# Patient Record
Sex: MALE | Race: WHITE | Employment: OTHER | URBAN - METROPOLITAN AREA
[De-identification: names, ages, dates, MRNs, and addresses within clinical notes are randomized per-mention and may not be internally consistent; named-entity substitution may affect disease eponyms.]

---

## 2017-01-03 ENCOUNTER — TELEPHONE (OUTPATIENT)
Dept: TELEMETRY | Age: 57
End: 2017-01-03

## 2017-01-05 ENCOUNTER — TELEPHONE (OUTPATIENT)
Dept: CARDIOLOGY CLINIC | Age: 57
End: 2017-01-05

## 2017-01-05 ENCOUNTER — ANTI-COAG VISIT (OUTPATIENT)
Dept: PHARMACY | Facility: CLINIC | Age: 57
End: 2017-01-05

## 2017-01-05 LAB — INR BLD: 1.8

## 2017-01-10 ENCOUNTER — ANTI-COAG VISIT (OUTPATIENT)
Dept: PHARMACY | Facility: CLINIC | Age: 57
End: 2017-01-10

## 2017-01-10 LAB — INR BLD: 1.7

## 2017-01-12 ENCOUNTER — HOSPITAL ENCOUNTER (OUTPATIENT)
Dept: GENERAL RADIOLOGY | Age: 57
Discharge: HOME | End: 2017-01-12
Attending: INTERNAL MEDICINE | Admitting: INTERNAL MEDICINE

## 2017-01-12 ENCOUNTER — OFFICE VISIT (OUTPATIENT)
Dept: CARDIOLOGY CLINIC | Age: 57
End: 2017-01-12

## 2017-01-12 VITALS
HEIGHT: 75 IN | WEIGHT: 298.4 LBS | SYSTOLIC BLOOD PRESSURE: 126 MMHG | BODY MASS INDEX: 37.1 KG/M2 | DIASTOLIC BLOOD PRESSURE: 88 MMHG | HEART RATE: 62 BPM

## 2017-01-12 DIAGNOSIS — E11.9 TYPE 2 DIABETES MELLITUS WITHOUT COMPLICATION, UNSPECIFIED LONG TERM INSULIN USE STATUS: ICD-10-CM

## 2017-01-12 DIAGNOSIS — E66.01 MORBID OBESITY DUE TO EXCESS CALORIES (HCC): ICD-10-CM

## 2017-01-12 DIAGNOSIS — I50.32 CHRONIC DIASTOLIC HEART FAILURE (HCC): ICD-10-CM

## 2017-01-12 DIAGNOSIS — G47.33 OSA (OBSTRUCTIVE SLEEP APNEA): ICD-10-CM

## 2017-01-12 DIAGNOSIS — I50.32 CHRONIC DIASTOLIC CONGESTIVE HEART FAILURE (HCC): Primary | ICD-10-CM

## 2017-01-12 DIAGNOSIS — I48.21 PERMANENT ATRIAL FIBRILLATION (HCC): ICD-10-CM

## 2017-01-12 LAB
ANION GAP SERPL CALCULATED.3IONS-SCNC: 14 MMOL/L (ref 3–16)
BASOPHILS ABSOLUTE: 0.1 K/UL (ref 0–0.2)
BASOPHILS RELATIVE PERCENT: 1.4 %
BUN BLDV-MCNC: 18 MG/DL (ref 7–20)
CALCIUM SERPL-MCNC: 9.3 MG/DL (ref 8.3–10.6)
CHLORIDE BLD-SCNC: 95 MMOL/L (ref 99–110)
CO2: 25 MMOL/L (ref 21–32)
CREAT SERPL-MCNC: 0.7 MG/DL (ref 0.9–1.3)
EOSINOPHILS ABSOLUTE: 0.1 K/UL (ref 0–0.6)
EOSINOPHILS RELATIVE PERCENT: 1.5 %
GFR AFRICAN AMERICAN: >60
GFR NON-AFRICAN AMERICAN: >60
GLUCOSE BLD-MCNC: 160 MG/DL (ref 70–99)
HCT VFR BLD CALC: 51.6 % (ref 40.5–52.5)
HEMOGLOBIN: 16.7 G/DL (ref 13.5–17.5)
LYMPHOCYTES ABSOLUTE: 1.6 K/UL (ref 1–5.1)
LYMPHOCYTES RELATIVE PERCENT: 16.7 %
MCH RBC QN AUTO: 27.7 PG (ref 26–34)
MCHC RBC AUTO-ENTMCNC: 32.4 G/DL (ref 31–36)
MCV RBC AUTO: 85.6 FL (ref 80–100)
MONOCYTES ABSOLUTE: 0.8 K/UL (ref 0–1.3)
MONOCYTES RELATIVE PERCENT: 8.2 %
NEUTROPHILS ABSOLUTE: 6.9 K/UL (ref 1.7–7.7)
NEUTROPHILS RELATIVE PERCENT: 72.2 %
PDW BLD-RTO: 15.5 % (ref 12.4–15.4)
PLATELET # BLD: 204 K/UL (ref 135–450)
PMV BLD AUTO: 9.9 FL (ref 5–10.5)
POTASSIUM SERPL-SCNC: 5 MMOL/L (ref 3.5–5.1)
PRO-BNP: 607 PG/ML (ref 0–124)
RBC # BLD: 6.02 M/UL (ref 4.2–5.9)
SODIUM BLD-SCNC: 134 MMOL/L (ref 136–145)
WBC # BLD: 9.6 K/UL (ref 4–11)

## 2017-01-12 PROCEDURE — 3045F PR MOST RECENT HEMOGLOBIN A1C LEVEL 7.0-9.0%: CPT | Performed by: INTERNAL MEDICINE

## 2017-01-12 PROCEDURE — G8484 FLU IMMUNIZE NO ADMIN: HCPCS | Performed by: INTERNAL MEDICINE

## 2017-01-12 PROCEDURE — 99214 OFFICE O/P EST MOD 30 MIN: CPT | Performed by: INTERNAL MEDICINE

## 2017-01-12 PROCEDURE — 4004F PT TOBACCO SCREEN RCVD TLK: CPT | Performed by: INTERNAL MEDICINE

## 2017-01-12 PROCEDURE — 1111F DSCHRG MED/CURRENT MED MERGE: CPT | Performed by: INTERNAL MEDICINE

## 2017-01-12 PROCEDURE — G8427 DOCREV CUR MEDS BY ELIG CLIN: HCPCS | Performed by: INTERNAL MEDICINE

## 2017-01-12 PROCEDURE — G8419 CALC BMI OUT NRM PARAM NOF/U: HCPCS | Performed by: INTERNAL MEDICINE

## 2017-01-12 PROCEDURE — 3017F COLORECTAL CA SCREEN DOC REV: CPT | Performed by: INTERNAL MEDICINE

## 2017-01-13 LAB
ESTIMATED AVERAGE GLUCOSE: 188.6 MG/DL
HBA1C MFR BLD: 8.2 %

## 2017-01-24 ENCOUNTER — ANTI-COAG VISIT (OUTPATIENT)
Dept: PHARMACY | Facility: CLINIC | Age: 57
End: 2017-01-24

## 2017-01-24 LAB — INR BLD: 1.6

## 2017-01-31 ENCOUNTER — ANTI-COAG VISIT (OUTPATIENT)
Dept: PHARMACY | Facility: CLINIC | Age: 57
End: 2017-01-31

## 2017-01-31 LAB — INR BLD: 2.5

## 2017-02-07 ENCOUNTER — ANTI-COAG VISIT (OUTPATIENT)
Dept: PHARMACY | Facility: CLINIC | Age: 57
End: 2017-02-07

## 2017-02-07 LAB — INR BLD: 1.9

## 2017-02-14 LAB — INR BLD: 2.3

## 2017-02-16 ENCOUNTER — ANTI-COAG VISIT (OUTPATIENT)
Dept: PHARMACY | Facility: CLINIC | Age: 57
End: 2017-02-16

## 2017-02-24 ENCOUNTER — OFFICE VISIT (OUTPATIENT)
Dept: CARDIOLOGY CLINIC | Age: 57
End: 2017-02-24

## 2017-02-24 VITALS
OXYGEN SATURATION: 91 % | HEIGHT: 75 IN | WEIGHT: 297.2 LBS | HEART RATE: 78 BPM | BODY MASS INDEX: 36.95 KG/M2 | DIASTOLIC BLOOD PRESSURE: 72 MMHG | SYSTOLIC BLOOD PRESSURE: 110 MMHG

## 2017-02-24 DIAGNOSIS — I50.32 CHRONIC DIASTOLIC HEART FAILURE (HCC): Primary | ICD-10-CM

## 2017-02-24 DIAGNOSIS — I10 ESSENTIAL HYPERTENSION: ICD-10-CM

## 2017-02-24 DIAGNOSIS — M54.9 BACK PAIN, UNSPECIFIED BACK LOCATION, UNSPECIFIED BACK PAIN LATERALITY, UNSPECIFIED CHRONICITY: ICD-10-CM

## 2017-02-24 DIAGNOSIS — I48.21 PERMANENT ATRIAL FIBRILLATION (HCC): ICD-10-CM

## 2017-02-24 DIAGNOSIS — R06.09 DOE (DYSPNEA ON EXERTION): ICD-10-CM

## 2017-02-24 PROCEDURE — 99214 OFFICE O/P EST MOD 30 MIN: CPT | Performed by: INTERNAL MEDICINE

## 2017-02-24 PROCEDURE — 3017F COLORECTAL CA SCREEN DOC REV: CPT | Performed by: INTERNAL MEDICINE

## 2017-02-24 PROCEDURE — G8417 CALC BMI ABV UP PARAM F/U: HCPCS | Performed by: INTERNAL MEDICINE

## 2017-02-24 PROCEDURE — G8484 FLU IMMUNIZE NO ADMIN: HCPCS | Performed by: INTERNAL MEDICINE

## 2017-02-24 PROCEDURE — 4004F PT TOBACCO SCREEN RCVD TLK: CPT | Performed by: INTERNAL MEDICINE

## 2017-02-24 PROCEDURE — G8427 DOCREV CUR MEDS BY ELIG CLIN: HCPCS | Performed by: INTERNAL MEDICINE

## 2017-02-24 RX ORDER — SPIRONOLACTONE 25 MG/1
25 TABLET ORAL DAILY
Qty: 30 TABLET | Refills: 5 | Status: SHIPPED | OUTPATIENT
Start: 2017-02-24 | End: 2018-01-03 | Stop reason: SDUPTHER

## 2017-04-04 ENCOUNTER — OFFICE VISIT (OUTPATIENT)
Dept: PULMONOLOGY | Age: 57
End: 2017-04-04

## 2017-04-04 VITALS
SYSTOLIC BLOOD PRESSURE: 138 MMHG | RESPIRATION RATE: 18 BRPM | BODY MASS INDEX: 37.5 KG/M2 | HEART RATE: 84 BPM | WEIGHT: 301.6 LBS | HEIGHT: 75 IN | OXYGEN SATURATION: 94 % | DIASTOLIC BLOOD PRESSURE: 78 MMHG | TEMPERATURE: 97.3 F

## 2017-04-04 DIAGNOSIS — R53.83 OTHER FATIGUE: ICD-10-CM

## 2017-04-04 DIAGNOSIS — R93.89 ABNORMAL CT SCAN, CHEST: ICD-10-CM

## 2017-04-04 DIAGNOSIS — R06.83 SNORING: ICD-10-CM

## 2017-04-04 DIAGNOSIS — F17.200 CURRENT SMOKER: ICD-10-CM

## 2017-04-04 DIAGNOSIS — R06.09 DYSPNEA ON EXERTION: ICD-10-CM

## 2017-04-04 DIAGNOSIS — R06.81 WITNESSED APNEIC SPELLS: Primary | ICD-10-CM

## 2017-04-04 PROCEDURE — G8417 CALC BMI ABV UP PARAM F/U: HCPCS | Performed by: INTERNAL MEDICINE

## 2017-04-04 PROCEDURE — 3017F COLORECTAL CA SCREEN DOC REV: CPT | Performed by: INTERNAL MEDICINE

## 2017-04-04 PROCEDURE — 4004F PT TOBACCO SCREEN RCVD TLK: CPT | Performed by: INTERNAL MEDICINE

## 2017-04-04 PROCEDURE — 99204 OFFICE O/P NEW MOD 45 MIN: CPT | Performed by: INTERNAL MEDICINE

## 2017-04-04 PROCEDURE — G8427 DOCREV CUR MEDS BY ELIG CLIN: HCPCS | Performed by: INTERNAL MEDICINE

## 2017-04-04 RX ORDER — ALBUTEROL SULFATE 90 UG/1
2 AEROSOL, METERED RESPIRATORY (INHALATION) EVERY 6 HOURS PRN
Qty: 1 INHALER | Refills: 6 | Status: SHIPPED | OUTPATIENT
Start: 2017-04-04

## 2017-04-04 ASSESSMENT — SLEEP AND FATIGUE QUESTIONNAIRES
HOW LIKELY ARE YOU TO NOD OFF OR FALL ASLEEP WHEN YOU ARE A PASSENGER IN A CAR FOR AN HOUR WITHOUT A BREAK: 0
HOW LIKELY ARE YOU TO NOD OFF OR FALL ASLEEP WHILE SITTING INACTIVE IN A PUBLIC PLACE: 0
NECK CIRCUMFERENCE (INCHES): 18
HOW LIKELY ARE YOU TO NOD OFF OR FALL ASLEEP WHILE SITTING QUIETLY AFTER LUNCH WITHOUT ALCOHOL: 0
HOW LIKELY ARE YOU TO NOD OFF OR FALL ASLEEP WHILE SITTING AND TALKING TO SOMEONE: 0
HOW LIKELY ARE YOU TO NOD OFF OR FALL ASLEEP IN A CAR, WHILE STOPPED FOR A FEW MINUTES IN TRAFFIC: 0
HOW LIKELY ARE YOU TO NOD OFF OR FALL ASLEEP WHILE WATCHING TV: 1
HOW LIKELY ARE YOU TO NOD OFF OR FALL ASLEEP WHILE SITTING AND READING: 0
ESS TOTAL SCORE: 4
HOW LIKELY ARE YOU TO NOD OFF OR FALL ASLEEP WHILE LYING DOWN TO REST IN THE AFTERNOON WHEN CIRCUMSTANCES PERMIT: 3

## 2017-04-13 ENCOUNTER — HOSPITAL ENCOUNTER (OUTPATIENT)
Dept: PULMONOLOGY | Age: 57
Discharge: HOME | End: 2017-04-13
Attending: INTERNAL MEDICINE | Admitting: INTERNAL MEDICINE

## 2017-04-13 DIAGNOSIS — R93.89 ABNORMAL CT SCAN, CHEST: ICD-10-CM

## 2017-04-13 DIAGNOSIS — R06.00 DYSPNEA: ICD-10-CM

## 2017-04-13 RX ORDER — ALBUTEROL SULFATE 90 UG/1
6 AEROSOL, METERED RESPIRATORY (INHALATION) ONCE
Status: COMPLETED | OUTPATIENT
Start: 2017-04-13 | End: 2017-04-13

## 2017-04-13 RX ADMIN — ALBUTEROL SULFATE 6 PUFF: 90 AEROSOL, METERED RESPIRATORY (INHALATION) at 11:41

## 2017-04-18 ENCOUNTER — ANTI-COAG VISIT (OUTPATIENT)
Dept: PHARMACY | Facility: CLINIC | Age: 57
End: 2017-04-18

## 2017-04-18 LAB — INR BLD: 2.7

## 2017-04-19 ENCOUNTER — HOSPITAL ENCOUNTER (OUTPATIENT)
Dept: SLEEP MEDICINE | Age: 57
Discharge: HOME | End: 2017-04-21
Attending: INTERNAL MEDICINE | Admitting: INTERNAL MEDICINE

## 2017-04-19 DIAGNOSIS — R06.81 WITNESSED APNEIC SPELLS: ICD-10-CM

## 2017-04-19 DIAGNOSIS — R06.83 SNORING: ICD-10-CM

## 2017-04-19 DIAGNOSIS — R53.83 OTHER FATIGUE: ICD-10-CM

## 2017-04-20 ENCOUNTER — TELEPHONE (OUTPATIENT)
Dept: PULMONOLOGY | Age: 57
End: 2017-04-20

## 2017-04-20 DIAGNOSIS — G47.33 OSA (OBSTRUCTIVE SLEEP APNEA): Primary | ICD-10-CM

## 2017-05-15 ENCOUNTER — HOSPITAL ENCOUNTER (OUTPATIENT)
Dept: GENERAL RADIOLOGY | Age: 57
Discharge: HOME | End: 2017-05-15
Attending: INTERNAL MEDICINE | Admitting: INTERNAL MEDICINE

## 2017-05-15 DIAGNOSIS — I50.32 CHRONIC DIASTOLIC HEART FAILURE (HCC): ICD-10-CM

## 2017-05-15 LAB
A/G RATIO: 1.4 (ref 1.1–2.2)
ALBUMIN SERPL-MCNC: 4 G/DL (ref 3.4–5)
ALP BLD-CCNC: 53 U/L (ref 40–129)
ALT SERPL-CCNC: 50 U/L (ref 10–40)
ANION GAP SERPL CALCULATED.3IONS-SCNC: 16 MMOL/L (ref 3–16)
AST SERPL-CCNC: 32 U/L (ref 15–37)
BASOPHILS ABSOLUTE: 0.1 K/UL (ref 0–0.2)
BASOPHILS RELATIVE PERCENT: 0.7 %
BILIRUB SERPL-MCNC: 0.4 MG/DL (ref 0–1)
BUN BLDV-MCNC: 23 MG/DL (ref 7–20)
CALCIUM SERPL-MCNC: 9.4 MG/DL (ref 8.3–10.6)
CHLORIDE BLD-SCNC: 98 MMOL/L (ref 99–110)
CO2: 26 MMOL/L (ref 21–32)
CREAT SERPL-MCNC: 0.9 MG/DL (ref 0.9–1.3)
EOSINOPHILS ABSOLUTE: 0.1 K/UL (ref 0–0.6)
EOSINOPHILS RELATIVE PERCENT: 1.6 %
GFR AFRICAN AMERICAN: >60
GFR NON-AFRICAN AMERICAN: >60
GLOBULIN: 2.8 G/DL
GLUCOSE BLD-MCNC: 120 MG/DL (ref 70–99)
HCT VFR BLD CALC: 48.9 % (ref 40.5–52.5)
HEMOGLOBIN: 16 G/DL (ref 13.5–17.5)
LYMPHOCYTES ABSOLUTE: 1.5 K/UL (ref 1–5.1)
LYMPHOCYTES RELATIVE PERCENT: 18.5 %
MCH RBC QN AUTO: 29.9 PG (ref 26–34)
MCHC RBC AUTO-ENTMCNC: 32.8 G/DL (ref 31–36)
MCV RBC AUTO: 91.3 FL (ref 80–100)
MONOCYTES ABSOLUTE: 0.7 K/UL (ref 0–1.3)
MONOCYTES RELATIVE PERCENT: 9.1 %
NEUTROPHILS ABSOLUTE: 5.7 K/UL (ref 1.7–7.7)
NEUTROPHILS RELATIVE PERCENT: 70.1 %
PDW BLD-RTO: 13.8 % (ref 12.4–15.4)
PLATELET # BLD: 239 K/UL (ref 135–450)
PMV BLD AUTO: 8.9 FL (ref 5–10.5)
POTASSIUM SERPL-SCNC: 4.5 MMOL/L (ref 3.5–5.1)
PRO-BNP: 901 PG/ML (ref 0–124)
RBC # BLD: 5.36 M/UL (ref 4.2–5.9)
SODIUM BLD-SCNC: 140 MMOL/L (ref 136–145)
TOTAL PROTEIN: 6.8 G/DL (ref 6.4–8.2)
WBC # BLD: 8.2 K/UL (ref 4–11)

## 2017-05-31 ENCOUNTER — OFFICE VISIT (OUTPATIENT)
Dept: CARDIOLOGY CLINIC | Age: 57
End: 2017-05-31

## 2017-05-31 VITALS
SYSTOLIC BLOOD PRESSURE: 124 MMHG | BODY MASS INDEX: 37.55 KG/M2 | HEART RATE: 76 BPM | HEIGHT: 75 IN | DIASTOLIC BLOOD PRESSURE: 76 MMHG | WEIGHT: 302 LBS

## 2017-05-31 DIAGNOSIS — I50.32 CHRONIC DIASTOLIC HEART FAILURE (HCC): Primary | ICD-10-CM

## 2017-05-31 DIAGNOSIS — I10 ESSENTIAL HYPERTENSION: ICD-10-CM

## 2017-05-31 DIAGNOSIS — I50.31 ACUTE DIASTOLIC (CONGESTIVE) HEART FAILURE (HCC): ICD-10-CM

## 2017-05-31 DIAGNOSIS — G47.33 OBSTRUCTIVE SLEEP APNEA SYNDROME: ICD-10-CM

## 2017-05-31 DIAGNOSIS — R06.09 DOE (DYSPNEA ON EXERTION): ICD-10-CM

## 2017-05-31 PROCEDURE — 99214 OFFICE O/P EST MOD 30 MIN: CPT | Performed by: INTERNAL MEDICINE

## 2017-05-31 PROCEDURE — 4004F PT TOBACCO SCREEN RCVD TLK: CPT | Performed by: INTERNAL MEDICINE

## 2017-05-31 PROCEDURE — G8427 DOCREV CUR MEDS BY ELIG CLIN: HCPCS | Performed by: INTERNAL MEDICINE

## 2017-05-31 PROCEDURE — 3017F COLORECTAL CA SCREEN DOC REV: CPT | Performed by: INTERNAL MEDICINE

## 2017-05-31 PROCEDURE — G8417 CALC BMI ABV UP PARAM F/U: HCPCS | Performed by: INTERNAL MEDICINE

## 2017-06-01 ENCOUNTER — HOSPITAL ENCOUNTER (OUTPATIENT)
Dept: SLEEP MEDICINE | Age: 57
Discharge: HOME | End: 2017-06-03
Attending: INTERNAL MEDICINE | Admitting: INTERNAL MEDICINE

## 2017-06-01 DIAGNOSIS — G47.33 OSA (OBSTRUCTIVE SLEEP APNEA): ICD-10-CM

## 2017-06-05 DIAGNOSIS — G47.33 OSA (OBSTRUCTIVE SLEEP APNEA): Primary | ICD-10-CM

## 2017-08-29 ENCOUNTER — TELEPHONE (OUTPATIENT)
Dept: PULMONOLOGY | Age: 57
End: 2017-08-29

## 2017-09-05 ENCOUNTER — HOSPITAL ENCOUNTER (OUTPATIENT)
Dept: OTHER | Age: 57
Discharge: HOME | End: 2017-09-30
Attending: INTERNAL MEDICINE | Admitting: INTERNAL MEDICINE

## 2017-09-05 LAB
ANION GAP SERPL CALCULATED.3IONS-SCNC: 15 MMOL/L (ref 3–16)
BUN BLDV-MCNC: 32 MG/DL (ref 7–20)
CALCIUM SERPL-MCNC: 9.6 MG/DL (ref 8.3–10.6)
CHLORIDE BLD-SCNC: 99 MMOL/L (ref 99–110)
CO2: 27 MMOL/L (ref 21–32)
CREAT SERPL-MCNC: 1 MG/DL (ref 0.9–1.3)
GFR AFRICAN AMERICAN: >60
GFR NON-AFRICAN AMERICAN: >60
GLUCOSE BLD-MCNC: 100 MG/DL (ref 70–99)
POTASSIUM SERPL-SCNC: 4.7 MMOL/L (ref 3.5–5.1)
PRO-BNP: 834 PG/ML (ref 0–124)
SODIUM BLD-SCNC: 141 MMOL/L (ref 136–145)

## 2017-09-06 ENCOUNTER — OFFICE VISIT (OUTPATIENT)
Dept: CARDIOLOGY CLINIC | Age: 57
End: 2017-09-06

## 2017-09-06 VITALS
WEIGHT: 301 LBS | SYSTOLIC BLOOD PRESSURE: 110 MMHG | BODY MASS INDEX: 37.42 KG/M2 | DIASTOLIC BLOOD PRESSURE: 80 MMHG | OXYGEN SATURATION: 95 % | HEIGHT: 75 IN | HEART RATE: 93 BPM

## 2017-09-06 DIAGNOSIS — Z72.0 TOBACCO ABUSE: ICD-10-CM

## 2017-09-06 DIAGNOSIS — G47.33 OBSTRUCTIVE SLEEP APNEA SYNDROME: ICD-10-CM

## 2017-09-06 DIAGNOSIS — I50.32 CHRONIC DIASTOLIC HEART FAILURE (HCC): Primary | ICD-10-CM

## 2017-09-06 PROCEDURE — 4004F PT TOBACCO SCREEN RCVD TLK: CPT | Performed by: INTERNAL MEDICINE

## 2017-09-06 PROCEDURE — 3017F COLORECTAL CA SCREEN DOC REV: CPT | Performed by: INTERNAL MEDICINE

## 2017-09-06 PROCEDURE — 99214 OFFICE O/P EST MOD 30 MIN: CPT | Performed by: INTERNAL MEDICINE

## 2017-09-06 PROCEDURE — G8417 CALC BMI ABV UP PARAM F/U: HCPCS | Performed by: INTERNAL MEDICINE

## 2017-09-06 PROCEDURE — G8427 DOCREV CUR MEDS BY ELIG CLIN: HCPCS | Performed by: INTERNAL MEDICINE

## 2017-09-08 ENCOUNTER — ANTI-COAG VISIT (OUTPATIENT)
Dept: PHARMACY | Facility: CLINIC | Age: 57
End: 2017-09-08

## 2017-09-08 LAB — INR BLD: 2.6

## 2017-09-27 ENCOUNTER — TELEPHONE (OUTPATIENT)
Dept: PULMONOLOGY | Age: 57
End: 2017-09-27

## 2017-10-04 ENCOUNTER — TELEPHONE (OUTPATIENT)
Dept: PULMONOLOGY | Age: 57
End: 2017-10-04

## 2018-01-03 DIAGNOSIS — I50.32 CHRONIC DIASTOLIC HEART FAILURE (HCC): ICD-10-CM

## 2018-01-03 RX ORDER — SPIRONOLACTONE 25 MG/1
25 TABLET ORAL DAILY
Qty: 30 TABLET | Refills: 5 | Status: SHIPPED | OUTPATIENT
Start: 2018-01-03

## 2018-01-09 ENCOUNTER — HOSPITAL ENCOUNTER (OUTPATIENT)
Dept: OTHER | Age: 58
Discharge: HOME | End: 2018-01-31
Attending: INTERNAL MEDICINE | Admitting: INTERNAL MEDICINE

## 2018-01-09 ENCOUNTER — OFFICE VISIT (OUTPATIENT)
Dept: CARDIOLOGY CLINIC | Age: 58
End: 2018-01-09

## 2018-01-09 ENCOUNTER — ANTI-COAG VISIT (OUTPATIENT)
Dept: PHARMACY | Facility: CLINIC | Age: 58
End: 2018-01-09

## 2018-01-09 VITALS
SYSTOLIC BLOOD PRESSURE: 104 MMHG | HEART RATE: 54 BPM | DIASTOLIC BLOOD PRESSURE: 86 MMHG | BODY MASS INDEX: 37.8 KG/M2 | WEIGHT: 304 LBS | HEIGHT: 75 IN | OXYGEN SATURATION: 93 %

## 2018-01-09 DIAGNOSIS — G47.33 OBSTRUCTIVE SLEEP APNEA SYNDROME: ICD-10-CM

## 2018-01-09 DIAGNOSIS — I10 ESSENTIAL HYPERTENSION: ICD-10-CM

## 2018-01-09 DIAGNOSIS — E66.01 MORBID OBESITY DUE TO EXCESS CALORIES (HCC): ICD-10-CM

## 2018-01-09 DIAGNOSIS — I50.32 CHRONIC DIASTOLIC HEART FAILURE (HCC): Primary | ICD-10-CM

## 2018-01-09 DIAGNOSIS — I48.21 PERMANENT ATRIAL FIBRILLATION (HCC): ICD-10-CM

## 2018-01-09 LAB — INR BLD: 3.1

## 2018-01-09 PROCEDURE — 99214 OFFICE O/P EST MOD 30 MIN: CPT | Performed by: INTERNAL MEDICINE

## 2018-01-09 PROCEDURE — 4004F PT TOBACCO SCREEN RCVD TLK: CPT | Performed by: INTERNAL MEDICINE

## 2018-01-09 PROCEDURE — G8427 DOCREV CUR MEDS BY ELIG CLIN: HCPCS | Performed by: INTERNAL MEDICINE

## 2018-01-09 PROCEDURE — 3017F COLORECTAL CA SCREEN DOC REV: CPT | Performed by: INTERNAL MEDICINE

## 2018-01-09 PROCEDURE — G8417 CALC BMI ABV UP PARAM F/U: HCPCS | Performed by: INTERNAL MEDICINE

## 2018-01-09 PROCEDURE — G8484 FLU IMMUNIZE NO ADMIN: HCPCS | Performed by: INTERNAL MEDICINE

## 2018-01-09 NOTE — LETTER
[address] [place] [address] Cardiology - [address] [place] [address] [address]  Phone: [phone]  Fax: [phone]    Rickey Mendoza MD        January 10, 2018     Palma Patel, [address] [place] [address] [facility]    Patient: Broderick Schroeder  MR Number: [id number]  YOB: 1960  Date of Visit: 1/9/2018    Dear Dr. Palma Patel:    Thank you for the request for consultation for Broderick Schroeder to me for the evaluation of CHF. Below are the relevant portions of my assessment and plan of care. Assessment:    1. Acute diastolic (congestive) heart failure ([address]    2. Chronic diastolic heart failure ([address]    3. Essential hypertension- BP: (104)/(86)     4. CHA (dyspnea on exertion)    5. Permanent atrial fibrillation ([address] on coumadin          Plan:  1. Smoking cessation discussed  2. Echo same day as 4 month follow up  3. Follow up with me in 4 months       If you have questions, please do not hesitate to call me. I look forward to following Marya Dandy along with you.     Sincerely,        Rickey Mendoza MD

## 2018-01-09 NOTE — PROGRESS NOTES
Allergies   Allergen Reactions    Flecainide      Rash and swelling     Current Outpatient Prescriptions   Medication Sig Dispense Refill    spironolactone (ALDACTONE) 25 MG tablet TAKE 1 TABLET BY MOUTH DAILY 30 tablet 5    albuterol sulfate HFA (VENTOLIN HFA) 108 (90 BASE) MCG/ACT inhaler Inhale 2 puffs into the lungs every 6 hours as needed for Wheezing or Shortness of Breath 1 Inhaler 6    furosemide (LASIX) 40 MG tablet Take 1 tablet by mouth 2 times daily 60 tablet 0    metoprolol tartrate (LOPRESSOR) 50 MG tablet Take 50 mg by mouth 2 times daily      oxyCODONE-acetaminophen (PERCOCET) 5-325 MG per tablet Take 1 tablet by mouth 2 times daily      quinapril (ACCUPRIL) 40 MG tablet Take 40 mg by mouth 2 times daily.  diltiazem (DILACOR XR) 240 MG XR capsule Take 240 mg by mouth daily.  cloNIDine (CATAPRES) 0.1 MG tablet Take 0.1 mg by mouth 2 times daily.  warfarin (COUMADIN) 5 MG tablet   Take 5 mg by mouth daily 10mg Q Tues-Sunday and 7.5mg Q Monday       No current facility-administered medications for this visit. Past Medical History:   Diagnosis Date    Asthma     allergy induced    [facility]     Diabetes mellitus ([facility]     Hypertension     Panic     Pneumonia      History reviewed. No pertinent surgical history. Family History   Problem Relation Age of Onset    Heart Disease Mother     High Blood Pressure Mother     Cancer Father      Social History     Social History    Marital status: Single     Spouse name: N/A    Number of children: N/A    Years of education: N/A     Occupational History    Not on file.      Social History Main Topics    Smoking status: Current Every Day Smoker     Packs/day: 0.25     Years: 40.00     Types: Cigarettes    Smokeless tobacco: Never Used      Comment: pack every 3 to 4 week    Alcohol use Yes      Comment: every other day    Drug use: Unknown    Sexual activity: Not on file     Other Topics Concern   

## 2018-02-01 ENCOUNTER — HOSPITAL ENCOUNTER (OUTPATIENT)
Dept: OTHER | Age: 58
Discharge: HOME | End: 2018-02-28
Attending: INTERNAL MEDICINE | Admitting: INTERNAL MEDICINE

## 2018-04-12 ENCOUNTER — HOSPITAL ENCOUNTER (OUTPATIENT)
Dept: OTHER | Age: 58
Discharge: HOME | End: 2018-04-30
Attending: INTERNAL MEDICINE | Admitting: INTERNAL MEDICINE

## 2018-04-12 ENCOUNTER — ANTI-COAG VISIT (OUTPATIENT)
Dept: PHARMACY | Facility: CLINIC | Age: 58
End: 2018-04-12

## 2018-04-12 LAB — INR BLD: 3.4

## 2018-05-01 ENCOUNTER — HOSPITAL ENCOUNTER (OUTPATIENT)
Dept: OTHER | Age: 58
Discharge: HOME | End: 2018-05-31
Attending: INTERNAL MEDICINE | Admitting: INTERNAL MEDICINE

## 2018-05-11 ENCOUNTER — HOSPITAL ENCOUNTER (OUTPATIENT)
Dept: CARDIOLOGY | Facility: CLINIC | Age: 58
Discharge: HOME | End: 2018-05-12
Attending: INTERNAL MEDICINE | Admitting: INTERNAL MEDICINE

## 2018-05-11 DIAGNOSIS — I50.32 CHRONIC DIASTOLIC HEART FAILURE (HCC): ICD-10-CM

## 2018-05-11 LAB
LV EF: 55 %
LVEF MODALITY: NORMAL

## 2018-05-15 ENCOUNTER — OFFICE VISIT (OUTPATIENT)
Dept: CARDIOLOGY CLINIC | Age: 58
End: 2018-05-15

## 2018-05-15 VITALS
DIASTOLIC BLOOD PRESSURE: 82 MMHG | HEART RATE: 62 BPM | WEIGHT: 300.4 LBS | SYSTOLIC BLOOD PRESSURE: 118 MMHG | OXYGEN SATURATION: 94 % | HEIGHT: 75 IN | BODY MASS INDEX: 37.35 KG/M2

## 2018-05-15 DIAGNOSIS — Z79.899 MEDICATION MANAGEMENT: ICD-10-CM

## 2018-05-15 DIAGNOSIS — Z72.0 TOBACCO ABUSE: ICD-10-CM

## 2018-05-15 DIAGNOSIS — E66.01 MORBID OBESITY DUE TO EXCESS CALORIES (HCC): ICD-10-CM

## 2018-05-15 DIAGNOSIS — I50.32 CHRONIC DIASTOLIC HEART FAILURE (HCC): Primary | ICD-10-CM

## 2018-05-15 DIAGNOSIS — R06.09 DOE (DYSPNEA ON EXERTION): ICD-10-CM

## 2018-05-15 DIAGNOSIS — I10 ESSENTIAL HYPERTENSION: ICD-10-CM

## 2018-05-15 DIAGNOSIS — G47.33 OBSTRUCTIVE SLEEP APNEA SYNDROME: ICD-10-CM

## 2018-05-15 DIAGNOSIS — Z79.899 MEDICATION MANAGEMENT: Primary | ICD-10-CM

## 2018-05-15 DIAGNOSIS — I48.21 PERMANENT ATRIAL FIBRILLATION (HCC): ICD-10-CM

## 2018-05-15 LAB
ANION GAP SERPL CALCULATED.3IONS-SCNC: 18 MMOL/L (ref 3–16)
BASOPHILS ABSOLUTE: 0.1 K/UL (ref 0–0.2)
BASOPHILS RELATIVE PERCENT: 0.8 %
BUN BLDV-MCNC: 21 MG/DL (ref 7–20)
CALCIUM SERPL-MCNC: 8.7 MG/DL (ref 8.3–10.6)
CHLORIDE BLD-SCNC: 99 MMOL/L (ref 99–110)
CHOLESTEROL, TOTAL: 156 MG/DL (ref 0–199)
CO2: 29 MMOL/L (ref 21–32)
CREAT SERPL-MCNC: 0.7 MG/DL (ref 0.9–1.3)
EOSINOPHILS ABSOLUTE: 0.2 K/UL (ref 0–0.6)
EOSINOPHILS RELATIVE PERCENT: 2.3 %
GFR AFRICAN AMERICAN: >60
GFR NON-AFRICAN AMERICAN: >60
GLUCOSE BLD-MCNC: 93 MG/DL (ref 70–99)
HCT VFR BLD CALC: 50.4 % (ref 40.5–52.5)
HDLC SERPL-MCNC: 38 MG/DL (ref 40–60)
HEMOGLOBIN: 17.2 G/DL (ref 13.5–17.5)
LDL CHOLESTEROL CALCULATED: 86 MG/DL
LYMPHOCYTES ABSOLUTE: 1.8 K/UL (ref 1–5.1)
LYMPHOCYTES RELATIVE PERCENT: 18.6 %
MCH RBC QN AUTO: 31.8 PG (ref 26–34)
MCHC RBC AUTO-ENTMCNC: 34.1 G/DL (ref 31–36)
MCV RBC AUTO: 93.2 FL (ref 80–100)
MONOCYTES ABSOLUTE: 0.9 K/UL (ref 0–1.3)
MONOCYTES RELATIVE PERCENT: 9.2 %
NEUTROPHILS ABSOLUTE: 6.5 K/UL (ref 1.7–7.7)
NEUTROPHILS RELATIVE PERCENT: 69.1 %
PDW BLD-RTO: 14.2 % (ref 12.4–15.4)
PLATELET # BLD: 165 K/UL (ref 135–450)
PMV BLD AUTO: 9.9 FL (ref 5–10.5)
POTASSIUM SERPL-SCNC: 4.1 MMOL/L (ref 3.5–5.1)
PRO-BNP: 868 PG/ML (ref 0–124)
RBC # BLD: 5.41 M/UL (ref 4.2–5.9)
SODIUM BLD-SCNC: 146 MMOL/L (ref 136–145)
TRIGL SERPL-MCNC: 160 MG/DL (ref 0–150)
VLDLC SERPL CALC-MCNC: 32 MG/DL
WBC # BLD: 9.5 K/UL (ref 4–11)

## 2018-05-15 PROCEDURE — 4004F PT TOBACCO SCREEN RCVD TLK: CPT | Performed by: INTERNAL MEDICINE

## 2018-05-15 PROCEDURE — G8417 CALC BMI ABV UP PARAM F/U: HCPCS | Performed by: INTERNAL MEDICINE

## 2018-05-15 PROCEDURE — 3017F COLORECTAL CA SCREEN DOC REV: CPT | Performed by: INTERNAL MEDICINE

## 2018-05-15 PROCEDURE — 99214 OFFICE O/P EST MOD 30 MIN: CPT | Performed by: INTERNAL MEDICINE

## 2018-05-15 PROCEDURE — G8427 DOCREV CUR MEDS BY ELIG CLIN: HCPCS | Performed by: INTERNAL MEDICINE

## 2018-06-01 ENCOUNTER — HOSPITAL ENCOUNTER (OUTPATIENT)
Dept: OTHER | Age: 58
Discharge: HOME | End: 2018-06-30
Attending: INTERNAL MEDICINE | Admitting: INTERNAL MEDICINE

## 2018-06-18 ENCOUNTER — ANTI-COAG VISIT (OUTPATIENT)
Dept: PHARMACY | Facility: CLINIC | Age: 58
End: 2018-06-18

## 2018-06-18 LAB — INR BLD: 2

## 2018-07-01 ENCOUNTER — HOSPITAL ENCOUNTER (OUTPATIENT)
Dept: OTHER | Age: 58
Discharge: HOME | End: 2018-07-01
Attending: INTERNAL MEDICINE | Admitting: INTERNAL MEDICINE

## 2018-07-16 ENCOUNTER — ANTI-COAG VISIT (OUTPATIENT)
Dept: PHARMACY | Age: 58
End: 2018-07-16
Payer: MEDICARE

## 2018-07-16 LAB — INR BLD: 2.4

## 2018-07-16 PROCEDURE — 85610 PROTHROMBIN TIME: CPT | Performed by: FAMILY MEDICINE

## 2018-07-16 PROCEDURE — 99211 OFF/OP EST MAY X REQ PHY/QHP: CPT | Performed by: FAMILY MEDICINE

## 2018-07-16 NOTE — PROGRESS NOTES
Mr. Chin Hall is here for management of anticoagulation for Afib. Pt started on Coumadin in 2013. He presents today w/out complaint. Pt verified dosing regimen. PMH significant DMII, HTN, and chronic back pain. Pt denies s/s bleeding/bruising/swelling/SOB. No BRBPR. No melena. Pt states he did not miss any doses. No changes in RX/OTCs/Herbal medications. Pt takes his Warfarin in the AM.   Pt has occasional EtOH use; occasional smoker. Pt has not been here since Sept of 2017. Hx of noncompliance with appts. No changes in diet or medications. No abnormal bruising or bleeding, but patient states he bruises less often lately. No missed doses. Patient states he has been taking 10mg Mon/Fri, and 7.5mg all other days. INR 2.4 is within acceptable therapeutic range of 2-3. Recommend to continue 7.5 mg daily, except 10mg on Mon/Fri. Patient has 5mg and 7.5mg tabs. Will continue to monitor and check in 4 weeks. Dosing reminder card given with phone number, appointment date and time.    Return to clinic: 8/13/18 at Erin Ye PharmD 7/16/2018 12:04 PM

## 2018-10-01 ENCOUNTER — ANTI-COAG VISIT (OUTPATIENT)
Dept: PHARMACY | Age: 58
End: 2018-10-01
Payer: MEDICARE

## 2018-10-01 LAB — INR BLD: 3.1

## 2018-10-01 PROCEDURE — 99211 OFF/OP EST MAY X REQ PHY/QHP: CPT | Performed by: INTERNAL MEDICINE

## 2018-10-01 PROCEDURE — 85610 PROTHROMBIN TIME: CPT | Performed by: INTERNAL MEDICINE

## 2018-10-01 RX ORDER — GABAPENTIN 300 MG/1
300 CAPSULE ORAL
COMMUNITY
End: 2022-04-26

## 2018-11-13 ENCOUNTER — OFFICE VISIT (OUTPATIENT)
Dept: CARDIOLOGY CLINIC | Age: 58
End: 2018-11-13
Payer: MEDICARE

## 2018-11-13 VITALS
OXYGEN SATURATION: 96 % | HEART RATE: 50 BPM | HEIGHT: 75 IN | SYSTOLIC BLOOD PRESSURE: 110 MMHG | DIASTOLIC BLOOD PRESSURE: 82 MMHG | WEIGHT: 305.2 LBS | BODY MASS INDEX: 37.95 KG/M2

## 2018-11-13 DIAGNOSIS — Z72.0 TOBACCO ABUSE: ICD-10-CM

## 2018-11-13 DIAGNOSIS — I50.32 CHRONIC DIASTOLIC HEART FAILURE (HCC): Primary | ICD-10-CM

## 2018-11-13 DIAGNOSIS — I48.21 PERMANENT ATRIAL FIBRILLATION (HCC): ICD-10-CM

## 2018-11-13 DIAGNOSIS — E66.01 MORBID OBESITY DUE TO EXCESS CALORIES (HCC): ICD-10-CM

## 2018-11-13 DIAGNOSIS — I10 ESSENTIAL HYPERTENSION: ICD-10-CM

## 2018-11-13 PROCEDURE — G8427 DOCREV CUR MEDS BY ELIG CLIN: HCPCS | Performed by: INTERNAL MEDICINE

## 2018-11-13 PROCEDURE — 3017F COLORECTAL CA SCREEN DOC REV: CPT | Performed by: INTERNAL MEDICINE

## 2018-11-13 PROCEDURE — G8484 FLU IMMUNIZE NO ADMIN: HCPCS | Performed by: INTERNAL MEDICINE

## 2018-11-13 PROCEDURE — 99214 OFFICE O/P EST MOD 30 MIN: CPT | Performed by: INTERNAL MEDICINE

## 2018-11-13 PROCEDURE — 4004F PT TOBACCO SCREEN RCVD TLK: CPT | Performed by: INTERNAL MEDICINE

## 2018-11-13 PROCEDURE — G8417 CALC BMI ABV UP PARAM F/U: HCPCS | Performed by: INTERNAL MEDICINE

## 2018-11-13 NOTE — LETTER
Echo done on 5/11/18 shows EF 55% (see report below). Today he reports he has been able to sleep in the bed at night. He purchased a new bed which has helped. He was started on gabapentin for chronic back pain. He had a colonoscopy in which they removed 14 polyps. He had one polyp that was suspicious. He is off warfarin for another week. He needs a repeat colonoscopy in 3 months. He denies CP, increased SOB, dizziness or syncope. ECHO 5/11/18   Summary   Normal left ventricle systolic function with an estimated ejection fraction   of 55%. No regional wall motion abnormalities are seen. Elevated left ventricular diastolic filling pressure. The left atrium is mildly dilated. The right atrium and ventricle are moderately dilated. Mild mitral and tricuspid regurgitation. Systolic pulmonary artery pressure (SPAP) estimated at 41 mmHg (RA pressure   8 mmHg), consistent with mild pulmonary hypertension. Atrial fibrillation throughout the study. Allergies   Allergen Reactions    Flecainide      Rash and swelling     Current Outpatient Prescriptions   Medication Sig Dispense Refill    gabapentin (NEURONTIN) 300 MG capsule Take 300 mg by mouth nightly. Andra Bar  spironolactone (ALDACTONE) 25 MG tablet TAKE 1 TABLET BY MOUTH DAILY 30 tablet 5    albuterol sulfate HFA (VENTOLIN HFA) 108 (90 BASE) MCG/ACT inhaler Inhale 2 puffs into the lungs every 6 hours as needed for Wheezing or Shortness of Breath 1 Inhaler 6    furosemide (LASIX) 40 MG tablet Take 1 tablet by mouth 2 times daily 60 tablet 0    metoprolol tartrate (LOPRESSOR) 50 MG tablet Take 50 mg by mouth 2 times daily      oxyCODONE-acetaminophen (PERCOCET) 5-325 MG per tablet Take 1 tablet by mouth 2 times daily      quinapril (ACCUPRIL) 40 MG tablet Take 40 mg by mouth 2 times daily.  diltiazem (DILACOR XR) 240 MG XR capsule Take 240 mg by mouth daily.  cloNIDine (CATAPRES) 0.1 MG tablet Take 0.1 mg by mouth 2 times daily.

## 2019-03-01 ENCOUNTER — ANTI-COAG VISIT (OUTPATIENT)
Dept: PHARMACY | Age: 59
End: 2019-03-01
Payer: MEDICAID

## 2019-03-01 LAB — INR BLD: 3.1

## 2019-03-01 PROCEDURE — 85610 PROTHROMBIN TIME: CPT | Performed by: FAMILY MEDICINE

## 2019-03-01 PROCEDURE — 99211 OFF/OP EST MAY X REQ PHY/QHP: CPT | Performed by: FAMILY MEDICINE

## 2019-03-18 ENCOUNTER — HOSPITAL ENCOUNTER (OUTPATIENT)
Age: 59
Discharge: HOME | End: 2019-03-18
Payer: MEDICARE

## 2019-03-18 DIAGNOSIS — I50.32 CHRONIC DIASTOLIC HEART FAILURE (HCC): ICD-10-CM

## 2019-03-18 DIAGNOSIS — R06.09 DOE (DYSPNEA ON EXERTION): ICD-10-CM

## 2019-03-18 DIAGNOSIS — I10 ESSENTIAL HYPERTENSION: ICD-10-CM

## 2019-03-18 DIAGNOSIS — G47.33 OBSTRUCTIVE SLEEP APNEA SYNDROME: ICD-10-CM

## 2019-03-18 LAB
A/G RATIO: 1.6 (ref 1.1–2.2)
ALBUMIN SERPL-MCNC: 4.3 G/DL (ref 3.4–5)
ALP BLD-CCNC: 44 U/L (ref 40–129)
ALT SERPL-CCNC: 52 U/L (ref 10–40)
ANION GAP SERPL CALCULATED.3IONS-SCNC: 16 MMOL/L (ref 3–16)
AST SERPL-CCNC: 37 U/L (ref 15–37)
BASOPHILS ABSOLUTE: 0 K/UL (ref 0–0.2)
BASOPHILS RELATIVE PERCENT: 0.7 %
BILIRUB SERPL-MCNC: 0.5 MG/DL (ref 0–1)
BUN BLDV-MCNC: 14 MG/DL (ref 7–20)
CALCIUM SERPL-MCNC: 9.1 MG/DL (ref 8.3–10.6)
CHLORIDE BLD-SCNC: 100 MMOL/L (ref 99–110)
CHOLESTEROL, TOTAL: 131 MG/DL (ref 0–199)
CO2: 27 MMOL/L (ref 21–32)
CREAT SERPL-MCNC: 0.7 MG/DL (ref 0.9–1.3)
EOSINOPHILS ABSOLUTE: 0.1 K/UL (ref 0–0.6)
EOSINOPHILS RELATIVE PERCENT: 2.2 %
GFR AFRICAN AMERICAN: >60
GFR NON-AFRICAN AMERICAN: >60
GLOBULIN: 2.7 G/DL
GLUCOSE BLD-MCNC: 103 MG/DL (ref 70–99)
HCT VFR BLD CALC: 51.7 % (ref 40.5–52.5)
HDLC SERPL-MCNC: 33 MG/DL (ref 40–60)
HEMOGLOBIN: 17.1 G/DL (ref 13.5–17.5)
LDL CHOLESTEROL CALCULATED: 58 MG/DL
LYMPHOCYTES ABSOLUTE: 1.8 K/UL (ref 1–5.1)
LYMPHOCYTES RELATIVE PERCENT: 28.3 %
MCH RBC QN AUTO: 30.7 PG (ref 26–34)
MCHC RBC AUTO-ENTMCNC: 33.2 G/DL (ref 31–36)
MCV RBC AUTO: 92.5 FL (ref 80–100)
MONOCYTES ABSOLUTE: 0.6 K/UL (ref 0–1.3)
MONOCYTES RELATIVE PERCENT: 9.1 %
NEUTROPHILS ABSOLUTE: 3.8 K/UL (ref 1.7–7.7)
NEUTROPHILS RELATIVE PERCENT: 59.7 %
PDW BLD-RTO: 14 % (ref 12.4–15.4)
PLATELET # BLD: 165 K/UL (ref 135–450)
PMV BLD AUTO: 9.6 FL (ref 5–10.5)
POTASSIUM SERPL-SCNC: 4 MMOL/L (ref 3.5–5.1)
PRO-BNP: 1169 PG/ML (ref 0–124)
RBC # BLD: 5.59 M/UL (ref 4.2–5.9)
SODIUM BLD-SCNC: 143 MMOL/L (ref 136–145)
TOTAL PROTEIN: 7 G/DL (ref 6.4–8.2)
TRIGL SERPL-MCNC: 202 MG/DL (ref 0–150)
VLDLC SERPL CALC-MCNC: 40 MG/DL
WBC # BLD: 6.3 K/UL (ref 4–11)

## 2019-03-18 PROCEDURE — 36415 COLL VENOUS BLD VENIPUNCTURE: CPT

## 2019-03-18 PROCEDURE — 85025 COMPLETE CBC W/AUTO DIFF WBC: CPT

## 2019-03-18 PROCEDURE — 80053 COMPREHEN METABOLIC PANEL: CPT

## 2019-03-18 PROCEDURE — 83880 ASSAY OF NATRIURETIC PEPTIDE: CPT

## 2019-03-18 PROCEDURE — 80061 LIPID PANEL: CPT

## 2019-03-19 ENCOUNTER — OFFICE VISIT (OUTPATIENT)
Dept: CARDIOLOGY CLINIC | Age: 59
End: 2019-03-19
Payer: COMMERCIAL

## 2019-03-19 VITALS
HEART RATE: 74 BPM | OXYGEN SATURATION: 96 % | BODY MASS INDEX: 38.84 KG/M2 | WEIGHT: 312.4 LBS | SYSTOLIC BLOOD PRESSURE: 110 MMHG | HEIGHT: 75 IN | DIASTOLIC BLOOD PRESSURE: 70 MMHG

## 2019-03-19 DIAGNOSIS — Z72.0 TOBACCO ABUSE: ICD-10-CM

## 2019-03-19 DIAGNOSIS — I10 ESSENTIAL HYPERTENSION: ICD-10-CM

## 2019-03-19 DIAGNOSIS — I50.32 CHRONIC DIASTOLIC HEART FAILURE (HCC): Primary | ICD-10-CM

## 2019-03-19 DIAGNOSIS — E78.2 MIXED HYPERLIPIDEMIA: ICD-10-CM

## 2019-03-19 DIAGNOSIS — I48.21 PERMANENT ATRIAL FIBRILLATION (HCC): ICD-10-CM

## 2019-03-19 PROCEDURE — G8417 CALC BMI ABV UP PARAM F/U: HCPCS | Performed by: INTERNAL MEDICINE

## 2019-03-19 PROCEDURE — G8484 FLU IMMUNIZE NO ADMIN: HCPCS | Performed by: INTERNAL MEDICINE

## 2019-03-19 PROCEDURE — 99214 OFFICE O/P EST MOD 30 MIN: CPT | Performed by: INTERNAL MEDICINE

## 2019-03-19 PROCEDURE — 4004F PT TOBACCO SCREEN RCVD TLK: CPT | Performed by: INTERNAL MEDICINE

## 2019-03-19 PROCEDURE — 3017F COLORECTAL CA SCREEN DOC REV: CPT | Performed by: INTERNAL MEDICINE

## 2019-03-19 PROCEDURE — G8427 DOCREV CUR MEDS BY ELIG CLIN: HCPCS | Performed by: INTERNAL MEDICINE

## 2019-03-19 RX ORDER — ATORVASTATIN CALCIUM 40 MG/1
40 TABLET, FILM COATED ORAL DAILY
COMMUNITY

## 2019-03-19 RX ORDER — LISINOPRIL 40 MG/1
40 TABLET ORAL DAILY
COMMUNITY

## 2019-04-04 ENCOUNTER — ANTI-COAG VISIT (OUTPATIENT)
Dept: PHARMACY | Age: 59
End: 2019-04-04
Payer: MEDICARE

## 2019-04-04 LAB — INR BLD: 2

## 2019-04-04 PROCEDURE — 99211 OFF/OP EST MAY X REQ PHY/QHP: CPT

## 2019-04-04 PROCEDURE — 85610 PROTHROMBIN TIME: CPT

## 2019-04-04 NOTE — PROGRESS NOTES
Mr. Uriah Mann is here for management of anticoagulation for Afib. Pt started on Coumadin in 2013. He presents today w/out complaint. Pt verified dosing regimen. PMH significant DMII, HTN, and chronic back pain. Pt denies s/s bleeding/bruising/swelling/SOB. No BRBPR. No melena. Pt states he did not miss any doses. No changes in OTCs/Herbal medications. Added gabapentin to his percocet at night, it is helping him sleep. Pt takes his Warfarin in the AM.   Pt has occasional EtOH use; occasional smoker. Pt has not been here since Sept of 2017. Hx of noncompliance with appts. INR 2.0 is within acceptable therapeutic range of 2-3. Recommend to continue  7.5 mg daily  Patient has 5mg and 7.5mg tabs. Will continue to monitor and check in 4 weeks. Dosing reminder card given with phone number, appointment date and time.    Return to clinic: 5/6 @ 11:30 am

## 2019-07-16 ENCOUNTER — ANTI-COAG VISIT (OUTPATIENT)
Dept: PHARMACY | Age: 59
End: 2019-07-16
Payer: COMMERCIAL

## 2019-07-16 LAB — INR BLD: 2.1

## 2019-07-16 PROCEDURE — 85610 PROTHROMBIN TIME: CPT | Performed by: INTERNAL MEDICINE

## 2019-07-16 PROCEDURE — 99211 OFF/OP EST MAY X REQ PHY/QHP: CPT | Performed by: INTERNAL MEDICINE

## 2019-09-09 NOTE — PROGRESS NOTES
[address]   Advanced Heart Failure/Pulmonary Hypertension  Cardiac Evaluation      Ge Medel  YOB: 1960    Date of Visit:  9/10/19    Chief Complaint   Patient presents with    6 Month Follow-Up     echo today    Congestive Heart Failure    Hypertension    Atrial Fibrillation    Shortness of Breath     has been sick the last 5 days         History of Present Illness:  Ge Medel is a 60 y.o. male who presents for follow up for CHF. He was admitted from 12/26/16-01/01/17 to [facility] with c/o sob and CHA on going since October this year. Pt was seen by PCP who treated him for pneumonia. 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 He is having to sit up most of the night as a result of persistent nocturnal coughing fits. He report snoring and problem having a sufficient sleep at night, PCP has been talking to him about getting a sleep study done. Pt is an active smoker, uses inhaler at home but unaware of any formal diagnosis of COPD. He is morbidly obese with chronic venous edema/stasis of BL lower ext which has not changed much from baseline. Pt denies any chest pain, no hx of CAD or CHF. Pt was found to be hypoxic in the mid 80s on RA which is new. CTA chest was negative for PE but showed bilateral areas of opacities, WBC was normal. Pt has baseline hx of HTN, Afib and obesity. He says that his swelling in legs is better but has significant abdominal swelling. He is on coumadin which is managed by his PCP. Echo done on 5/11/18 shows EF 55% (see report below). Today he reports he has increase SOB. He has been fighting a chest cold for the last 5 days. He started on mucinex on 09/09/19. He reports sinus drainage and chest tightness. He reports he has been coughing. He reports he will need surgery on his right shoulder soon.  He denies fatigue, palpitations, dizziness or syncope. He is taking lasix 40 mg twice daily. Allergies   Allergen Reactions    Flecainide      Rash and swelling     Current Outpatient Medications   Medication Sig Dispense Refill    atorvastatin (LIPITOR) 40 MG tablet Take 40 mg by mouth daily      lisinopril (PRINIVIL;ZESTRIL) 40 MG tablet Take 40 mg by mouth daily      gabapentin (NEURONTIN) 300 MG capsule Take 300 mg by mouth. PRN      spironolactone (ALDACTONE) 25 MG tablet TAKE 1 TABLET BY MOUTH DAILY 30 tablet 5    albuterol sulfate HFA (VENTOLIN HFA) 108 (90 BASE) MCG/ACT inhaler Inhale 2 puffs into the lungs every 6 hours as needed for Wheezing or Shortness of Breath 1 Inhaler 6    furosemide (LASIX) 40 MG tablet Take 1 tablet by mouth 2 times daily 60 tablet 0    metoprolol tartrate (LOPRESSOR) 50 MG tablet Take 50 mg by mouth 2 times daily      oxyCODONE-acetaminophen (PERCOCET) 5-325 MG per tablet Take 1 tablet by mouth 2 times daily      quinapril (ACCUPRIL) 40 MG tablet Take 40 mg by mouth 2 times daily.  diltiazem (DILACOR XR) 240 MG XR capsule Take 240 mg by mouth daily.  cloNIDine (CATAPRES) 0.1 MG tablet Take 0.1 mg by mouth 2 times daily.  warfarin (COUMADIN) 5 MG tablet   Take 5 mg by mouth daily 10mg Q Tues-Sunday and 7.5mg Q Monday       No current facility-administered medications for this visit. Past Medical History:   Diagnosis Date    Asthma     allergy induced    [facility]     Diabetes mellitus ([facility]     Hypertension     Panic     Pneumonia      History reviewed. No pertinent surgical history.   Family History   Problem Relation Age of Onset    Heart Disease Mother     High Blood Pressure Mother     Cancer Father      Social History     Socioeconomic History    Marital status: Single     Spouse name: Not on file    Number of children: Not on file    Years of education: Not on file    Highest education level: Not on file   Occupational History    Not on abnormalities of mood, affect, memory, mentation, or behavior are noted    Echo: 05/12/18   Normal left ventricle systolic function with an estimated ejection fraction   of 55%. No regional wall motion abnormalities are seen. Elevated left ventricular diastolic filling pressure. The left atrium is mildly dilated. The right atrium and ventricle are moderately dilated. Mild mitral and tricuspid regurgitation. Systolic pulmonary artery pressure (SPAP) estimated at 41 mmHg (RA pressure   8 mmHg), consistent with mild pulmonary hypertension. Atrial fibrillation throughout the study. Lipids: 03/2019: , LDL 88, HDL 42,     Labs were reviewed including labs from other hospital systems through [facility]. Cardiac testing was reviewed including echos, nuclear scans, cardiac catheterization, including from other hospital systems through [facility]. Assessment:    1. Chronic diastolic heart failure ([facility]    2. Essential hypertension    3. Permanent atrial fibrillation ([facility]    4. Tobacco abuse    5. Mixed hyperlipidemia         Plan:  1. Smoking cessation discussed  2. Lipids, BMP and BNP  3. Follow up in 6 months        QUALITY MEASURES  1. Tobacco Cessation Counseling: Yes  2. Retake of BP if >140/90:   NA  3. Documentation to PCP/referring for new patient:  Sent to PCP at close of office visit  4. CAD patient on anti-platelet: NA  5. CAD patient on STATIN therapy:  NA  6. Patient with CHF and aFib on anticoagulation:  Yes         I appreciate the opportunity of cooperating in the care of this patient. Scribe's attestation: This note was scribed in the presence of Nadia Thorne M.D. By Tina Riedel RN     The scribe's documentation has been prepared under my direction and personally reviewed by me in its entirety. I confirm that the note above accurately reflects all work, treatment, procedures, and medical decision making performed by me.       Nadia Thorne M.D.,

## 2019-09-10 ENCOUNTER — HOSPITAL ENCOUNTER (OUTPATIENT)
Dept: CARDIOLOGY | Age: 59
Discharge: HOME | End: 2019-09-10
Payer: COMMERCIAL

## 2019-09-10 ENCOUNTER — OFFICE VISIT (OUTPATIENT)
Dept: CARDIOLOGY CLINIC | Age: 59
End: 2019-09-10
Payer: COMMERCIAL

## 2019-09-10 VITALS
SYSTOLIC BLOOD PRESSURE: 126 MMHG | BODY MASS INDEX: 39.17 KG/M2 | HEIGHT: 75 IN | WEIGHT: 315 LBS | OXYGEN SATURATION: 94 % | DIASTOLIC BLOOD PRESSURE: 82 MMHG | HEART RATE: 90 BPM

## 2019-09-10 DIAGNOSIS — I10 ESSENTIAL HYPERTENSION: ICD-10-CM

## 2019-09-10 DIAGNOSIS — I48.21 PERMANENT ATRIAL FIBRILLATION (HCC): ICD-10-CM

## 2019-09-10 DIAGNOSIS — E78.2 MIXED HYPERLIPIDEMIA: ICD-10-CM

## 2019-09-10 DIAGNOSIS — I50.32 CHRONIC DIASTOLIC HEART FAILURE (HCC): Primary | ICD-10-CM

## 2019-09-10 DIAGNOSIS — Z72.0 TOBACCO ABUSE: ICD-10-CM

## 2019-09-10 DIAGNOSIS — I50.32 CHRONIC DIASTOLIC HEART FAILURE (HCC): ICD-10-CM

## 2019-09-10 LAB
LV EF: 50 %
LVEF MODALITY: NORMAL

## 2019-09-10 PROCEDURE — 93000 ELECTROCARDIOGRAM COMPLETE: CPT | Performed by: INTERNAL MEDICINE

## 2019-09-10 PROCEDURE — C8929 TTE W OR WO FOL WCON,DOPPLER: HCPCS

## 2019-09-10 PROCEDURE — 99214 OFFICE O/P EST MOD 30 MIN: CPT | Performed by: INTERNAL MEDICINE

## 2019-09-10 PROCEDURE — 6360000004 HC RX CONTRAST MEDICATION: Performed by: INTERNAL MEDICINE

## 2019-09-10 RX ADMIN — PERFLUTREN 2.2 MG: 6.52 INJECTION, SUSPENSION INTRAVENOUS at 10:42

## 2019-09-10 NOTE — LETTER
[facility], [place], operated by [facility]   Advanced Heart Failure/Pulmonary Hypertension  Cardiac Evaluation      Vidal Patrick  YOB: 1960    Date of Visit:  9/10/19    Chief Complaint   Patient presents with    6 Month Follow-Up     echo today    Congestive Heart Failure    Hypertension    Atrial Fibrillation    Shortness of Breath     has been sick the last 5 days         History of Present Illness:  Vidal Patrick is a 60 y.o. male who presents for follow up for CHF. He was admitted from 12/26/16-01/01/17 to [facility] with c/o sob and CHA on going since October this year. Pt was seen by PCP who treated him for pneumonia. 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 He is having to sit up most of the night as a result of persistent nocturnal coughing fits. He report snoring and problem having a sufficient sleep at night, PCP has been talking to him about getting a sleep study done. Pt is an active smoker, uses inhaler at home but unaware of any formal diagnosis of COPD. He is morbidly obese with chronic venous edema/stasis of BL lower ext which has not changed much from baseline. Pt denies any chest pain, no hx of CAD or CHF. Pt was found to be hypoxic in the mid 80s on RA which is new. CTA chest was negative for PE but showed bilateral areas of opacities, WBC was normal. Pt has baseline hx of HTN, Afib and obesity. He says that his swelling in legs is better but has significant abdominal swelling. He is on coumadin which is managed by his PCP. Echo done on 5/11/18 shows EF 55% (see report below). Today he reports he has increase SOB. He has been fighting a chest cold for the last 5 days. He started on mucinex on 09/09/19. He reports sinus drainage and chest tightness. He reports he has been coughing. He reports he will need surgery on his right shoulder soon.  He denies fatigue,

## 2019-10-16 ENCOUNTER — TELEPHONE (OUTPATIENT)
Dept: CARDIOLOGY CLINIC | Age: 59
End: 2019-10-16

## 2020-01-23 ENCOUNTER — ANTI-COAG VISIT (OUTPATIENT)
Dept: PHARMACY | Age: 60
End: 2020-01-23
Payer: MEDICARE

## 2020-01-23 LAB — INR BLD: 2

## 2020-01-23 PROCEDURE — 99211 OFF/OP EST MAY X REQ PHY/QHP: CPT

## 2020-01-23 PROCEDURE — 85610 PROTHROMBIN TIME: CPT

## 2020-01-23 NOTE — PROGRESS NOTES
Mr. Jaclyn Valle is here for management of anticoagulation for Afib. Pt started on Coumadin in 2013. He presents today w/out complaint. Pt verified dosing regimen. PMH significant DMII, HTN, and chronic back pain. Pt denies s/s bleeding/bruising/swelling/SOB. Pt states he did not miss any doses. No changes in Rx/OTCs/Herbal medications. Pt takes his Warfarin in the AM.   Pt has occasional EtOH use; occasional smoker. Pt has not been here since Sept of 2017. Hx of noncompliance with appts. Increased gabapentin from 300 mg bid to 300 mg tid     INR 2.0 is within acceptable therapeutic range of 2-3. Recommend to continue 7.5 mg daily  Patient has 5mg and 7.5mg tabs. Will continue to monitor and check in 4 weeks. Dosing reminder card given with phone number, appointment date and time. Return to clinic: 2/20 @ 1100   Referring physician: Yovanny Brown PharmD. Candidate 2020 1/23/20 10:21 AM     I have seen the patient and I agree with the assessment and plan created by the PharmD Candidate.   Lillian Andino PharmD 1/23/20 10:32 AM

## 2020-02-27 ENCOUNTER — ANTI-COAG VISIT (OUTPATIENT)
Dept: PHARMACY | Age: 60
End: 2020-02-27
Payer: MEDICARE

## 2020-02-27 ENCOUNTER — TELEPHONE (OUTPATIENT)
Dept: CARDIOLOGY CLINIC | Age: 60
End: 2020-02-27

## 2020-02-27 LAB — INR BLD: 1.9

## 2020-02-27 PROCEDURE — 99211 OFF/OP EST MAY X REQ PHY/QHP: CPT

## 2020-02-27 PROCEDURE — 85610 PROTHROMBIN TIME: CPT

## 2020-02-27 NOTE — TELEPHONE ENCOUNTER
[facility] Coumadin clinic is asking if pt needs lovenox bridging  for 5 day coumadin hold.  Please call to advise

## 2020-06-15 NOTE — PROGRESS NOTES
[address]   Advanced Heart Failure/Pulmonary Hypertension  Cardiac Evaluation      Alberta Stahl  YOB: 1960    Date of Visit:  6/16/20    Chief Complaint   Patient presents with    Follow-up    Congestive Heart Failure        History of Present Illness:  Alberta Stahl is a 60 y.o. male who presents for follow up for CHF. He was admitted from 12/26/16-01/01/17 to [facility] with c/o sob and CHA on going since October this year. Pt was seen by PCP who treated him for pneumonia. 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 He is having to sit up most of the night as a result of persistent nocturnal coughing fits. He report snoring and problem having a sufficient sleep at night, PCP has been talking to him about getting a sleep study done. Pt is an active smoker, uses inhaler at home but unaware of any formal diagnosis of COPD. He is morbidly obese with chronic venous edema/stasis of BL lower ext which has not changed much from baseline. Pt denies any chest pain, no hx of CAD or CHF. Pt was found to be hypoxic in the mid 80s on RA which is new. CTA chest was negative for PE but showed bilateral areas of opacities, WBC was normal. Pt has baseline hx of HTN, Afib and obesity. He says that his swelling in legs is better but has significant abdominal swelling. He is on coumadin which is managed by his PCP. Echo done on 5/11/18 shows EF 55% (see report below). Today his weight is up 20 lbs since 09/2019. He reports poor diet choices during the COVID 19 pandemic shut down. He reports occasional BLE edema before his evening dose of lasix but usually resolves after taking the medications. He denies CP, increased SOB, dizziness or syncope. He is due to see his PCP 07/05/2020. BP is elevated on exam. He takes lasix twice daily. He tends to forget his statin medication at night.  He is Inability: Not on file    Transportation needs     Medical: Not on file     Non-medical: Not on file   Tobacco Use    Smoking status: Current Every Day Smoker     Packs/day: 0.25     Years: 40.00     Pack years: 10.00     Types: Cigarettes    Smokeless tobacco: Never Used    Tobacco comment: pack every 3 to 4 week   Substance and Sexual Activity    Alcohol use: Yes     Comment: a couple times a week    Drug use: No    Sexual activity: Not on file   Lifestyle    Physical activity     Days per week: Not on file     Minutes per session: Not on file    Stress: Not on file   Relationships    Social connections     Talks on phone: Not on file     Gets together: Not on file     Attends [employer] service: Not on file     Active member of club or organization: Not on file     Attends meetings of clubs or organizations: Not on file     Relationship status: Not on file    Intimate partner violence     Fear of current or ex partner: Not on file     Emotionally abused: Not on file     Physically abused: Not on file     Forced sexual activity: Not on file   Other Topics Concern    Not on file   Social History Narrative    Not on file       Review of Systems:   · Constitutional: there has been no unanticipated weight loss. There's been no change in energy level, sleep pattern, or activity level. · Eyes: No visual changes or diplopia. No scleral icterus. · ENT: No Headaches, hearing loss or vertigo. No mouth sores or sore throat. · Cardiovascular: Reviewed in HPI  · Respiratory: No cough or wheezing, no sputum production. No hematemesis. · Gastrointestinal: No abdominal pain, appetite loss, blood in stools. No change in bowel or bladder habits. · Genitourinary: No dysuria, trouble voiding, or hematuria. · Musculoskeletal:  No gait disturbance, weakness or joint complaints. · Integumentary: No rash or pruritis. · Neurological: No headache, diplopia, change in muscle strength, numbness or tingling.  No

## 2020-06-16 ENCOUNTER — OFFICE VISIT (OUTPATIENT)
Dept: CARDIOLOGY CLINIC | Age: 60
End: 2020-06-16
Payer: MEDICARE

## 2020-06-16 VITALS
BODY MASS INDEX: 39.17 KG/M2 | HEART RATE: 57 BPM | HEIGHT: 75 IN | SYSTOLIC BLOOD PRESSURE: 146 MMHG | OXYGEN SATURATION: 94 % | DIASTOLIC BLOOD PRESSURE: 99 MMHG | WEIGHT: 315 LBS

## 2020-06-16 PROCEDURE — 99215 OFFICE O/P EST HI 40 MIN: CPT | Performed by: INTERNAL MEDICINE

## 2020-12-04 NOTE — PROGRESS NOTES
[address]   Advanced Heart Failure/Pulmonary Hypertension  Cardiac Evaluation      Marta Wilcox  YOB: 1960    Date of Visit:  12/8/20    Chief Complaint   Patient presents with    Follow-up    Congestive Heart Failure         History of Present Illness:  Marta Wilcox is a 63 y.o. male who presents for follow up for CHF. He was admitted from 12/26/16-01/01/17 to [facility] with c/o sob and CHA on going since October this year. Pt was seen by PCP who treated him for pneumonia. 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 He is having to sit up most of the night as a result of persistent nocturnal coughing fits. He report snoring and problem having a sufficient sleep at night, PCP has been talking to him about getting a sleep study done. Pt is an active smoker, uses inhaler at home but unaware of any formal diagnosis of COPD. He is morbidly obese with chronic venous edema/stasis of BL lower ext which has not changed much from baseline. Pt denies any chest pain, no hx of CAD or CHF. Pt was found to be hypoxic in the mid 80s on RA which is new. CTA chest was negative for PE but showed bilateral areas of opacities, WBC was normal. Pt has baseline hx of HTN, Afib and obesity. He says that his swelling in legs is better but has significant abdominal swelling. He is on coumadin which is managed by his PCP. Echo done on 5/11/18 shows EF 55% (see report below). On 06/16/20 his weight was up 20 lbs since 09/2019. He reports poor diet choices during the COVID 19 pandemic shut down. Today his echo showed RV dilated and normal EF. He reports he is unable to use the CPAP. He denies CP, SOB, dizziness or syncope. His weight is increasing. He continues to smoke. His A1C was increased to 8.3 in 10/2020. Lasix is twice a day. His coumadin clinic at [facility] 90 INR.  He is now sleeping in an adjustable bed. Allergies   Allergen Reactions    Flecainide      Rash and swelling     Current Outpatient Medications   Medication Sig Dispense Refill    atorvastatin (LIPITOR) 40 MG tablet Take 40 mg by mouth daily      lisinopril (PRINIVIL;ZESTRIL) 40 MG tablet Take 40 mg by mouth daily      gabapentin (NEURONTIN) 300 MG capsule Take 300 mg by mouth. PRN      spironolactone (ALDACTONE) 25 MG tablet TAKE 1 TABLET BY MOUTH DAILY 30 tablet 5    albuterol sulfate HFA (VENTOLIN HFA) 108 (90 BASE) MCG/ACT inhaler Inhale 2 puffs into the lungs every 6 hours as needed for Wheezing or Shortness of Breath 1 Inhaler 6    furosemide (LASIX) 40 MG tablet Take 1 tablet by mouth 2 times daily 60 tablet 0    metoprolol tartrate (LOPRESSOR) 50 MG tablet Take 50 mg by mouth 2 times daily      oxyCODONE-acetaminophen (PERCOCET) 5-325 MG per tablet Take 1 tablet by mouth 2 times daily      diltiazem (DILACOR XR) 240 MG XR capsule Take 240 mg by mouth daily.  cloNIDine (CATAPRES) 0.1 MG tablet Take 0.1 mg by mouth 2 times daily.  warfarin (COUMADIN) 5 MG tablet   Take 5 mg by mouth daily 10mg Q Tues-Sunday and 7.5mg Q Monday       No current facility-administered medications for this visit. Past Medical History:   Diagnosis Date    Asthma     allergy induced    [facility]rial fibrillation ([facility]     Diabetes mellitus ([facility]     Hypertension     Panic     Pneumonia      History reviewed. No pertinent surgical history.   Family History   Problem Relation Age of Onset    Heart Disease Mother     High Blood Pressure Mother     Cancer Father      Social History     Socioeconomic History    Marital status: Single     Spouse name: Not on file    Number of children: Not on file    Years of education: Not on file    Highest education level: Not on file   Occupational History    Not on file   Social Needs    Financial resource strain: Not on file    Food insecurity     Worry: Not on file Inability: Not on file    Transportation needs     Medical: Not on file     Non-medical: Not on file   Tobacco Use    Smoking status: Current Every Day Smoker     Packs/day: 0.25     Years: 40.00     Pack years: 10.00     Types: Cigarettes    Smokeless tobacco: Never Used    Tobacco comment: pack every 3 to 4 week   Substance and Sexual Activity    Alcohol use: Yes     Comment: a couple times a week    Drug use: No    Sexual activity: Not on file   Lifestyle    Physical activity     Days per week: Not on file     Minutes per session: Not on file    Stress: Not on file   Relationships    Social connections     Talks on phone: Not on file     Gets together: Not on file     Attends Baptism service: Not on file     Active member of club or organization: Not on file     Attends meetings of clubs or organizations: Not on file     Relationship status: Not on file    Intimate partner violence     Fear of current or ex partner: Not on file     Emotionally abused: Not on file     Physically abused: Not on file     Forced sexual activity: Not on file   Other Topics Concern    Not on file   Social History Narrative    Not on file       Review of Systems:   · Constitutional: there has been no unanticipated weight loss. There's been no change in energy level, sleep pattern, or activity level. · Eyes: No visual changes or diplopia. No scleral icterus. · ENT: No Headaches, hearing loss or vertigo. No mouth sores or sore throat. · Cardiovascular: Reviewed in HPI  · Respiratory: No cough or wheezing, no sputum production. No hematemesis. · Gastrointestinal: No abdominal pain, appetite loss, blood in stools. No change in bowel or bladder habits. · Genitourinary: No dysuria, trouble voiding, or hematuria. · Musculoskeletal:  No gait disturbance, weakness or joint complaints. · Integumentary: No rash or pruritis. · Neurological: No headache, diplopia, change in muscle strength, numbness or tingling.  No change in gait, balance, coordination, mood, affect, memory, mentation, behavior. · Psychiatric: No anxiety, no depression. · Endocrine: No malaise, fatigue or temperature intolerance. No excessive thirst, fluid intake, or urination. No tremor. · Hematologic/Lymphatic: No abnormal bruising or bleeding, blood clots or swollen lymph nodes. · Allergic/Immunologic: No nasal congestion or hives. Physical Examination:    Vitals:    12/08/20 1441   BP: 120/84   Pulse: 81   SpO2: 95%   Weight: (!) 337 lb 8 oz (153.1 kg)   Height: 6' 3\" (1.905 m)     Body mass index is 42.18 kg/m². Wt Readings from Last 3 Encounters:   12/08/20 (!) 337 lb 8 oz (153.1 kg)   06/16/20 (!) 337 lb (152.9 kg)   09/10/19 (!) 317 lb (143.8 kg)     BP Readings from Last 3 Encounters:   12/08/20 120/84   06/16/20 (!) 146/99   09/10/19 126/82            Constitutional and General Appearance:   WD/WN in NAD  HEENT:  NC/AT  Skin:  Warm, dry  Respiratory:  · Normal excursion and expansion without use of accessory muscles  · Resp Auscultation: Normal breath sounds without dullness  Cardiovascular:  · The apical impulses not displaced  · Heart tones are crisp and normal  · Cervical veins are not engorged  · The carotid upstroke is normal in amplitude and contour without delay or bruit  · JVP 9-10 cm H2O  Irregularly irregular rhythm with nl S1 and S2 without m,r,g  · Peripheral pulses are symmetrical and full  · There is no clubbing, cyanosis of the extremities.   · 1+ bilateral edema below the knees  · Femoral Arteries: 2+ and equal  · Pedal Pulses: 2+ and equal   Neck:  · JVP minimally elevated  · No thyromegaly  Abdomen:  · No masses or tenderness  · Liver/Spleen: No Abnormalities Noted  Neurological/Psychiatric:  · Alert and oriented in all spheres  · Moves all extremities well  · Exhibits normal gait balance and coordination  · No abnormalities of mood, affect, memory, mentation, or behavior are noted    Echo: 05/12/18   Normal left ventricle systolic function with an estimated ejection fraction   of 55%. No regional wall motion abnormalities are seen. Elevated left ventricular diastolic filling pressure. The left atrium is mildly dilated. The right atrium and ventricle are moderately dilated. Mild mitral and tricuspid regurgitation. Systolic pulmonary artery pressure (SPAP) estimated at 41 mmHg (RA pressure   8 mmHg), consistent with mild pulmonary hypertension. Atrial fibrillation throughout the study. Lipids: 03/2019: , LDL 88, HDL 42,     Labs were reviewed including labs from other hospital systems through [facility]. Cardiac testing was reviewed including echos, nuclear scans, cardiac catheterization, including from other hospital systems through [facility]. Assessment:    1. Chronic diastolic heart failure ([address]    2. Essential hypertension    3. Permanent atrial fibrillation ([facility]    4. Mixed hyperlipidemia             Plan:  1. Smoking cessation discussed  2. Re-establish with coumadin clinic  3. Follow up in 6 months      Scribe's attestation: This note was scribed in the presence of Santosh Santos M.D. By Rita Lopez RN     The scribe's documentation has been prepared under my direction and personally reviewed by me in its entirety. I confirm that the note above accurately reflects all work, treatment, procedures, and medical decision making performed by me. I appreciate the opportunity of cooperating in the care of this patient.     Santosh Santos M.D., [facility] [place]

## 2020-12-08 ENCOUNTER — OFFICE VISIT (OUTPATIENT)
Dept: CARDIOLOGY CLINIC | Age: 60
End: 2020-12-08
Payer: MEDICARE

## 2020-12-08 ENCOUNTER — HOSPITAL ENCOUNTER (OUTPATIENT)
Dept: CARDIOLOGY | Age: 60
Discharge: HOME | End: 2020-12-08
Payer: MEDICARE

## 2020-12-08 VITALS
DIASTOLIC BLOOD PRESSURE: 84 MMHG | WEIGHT: 315 LBS | HEART RATE: 81 BPM | BODY MASS INDEX: 39.17 KG/M2 | SYSTOLIC BLOOD PRESSURE: 120 MMHG | HEIGHT: 75 IN | OXYGEN SATURATION: 95 %

## 2020-12-08 LAB
LV EF: 50 %
LVEF MODALITY: NORMAL

## 2020-12-08 PROCEDURE — 99214 OFFICE O/P EST MOD 30 MIN: CPT | Performed by: INTERNAL MEDICINE

## 2020-12-08 PROCEDURE — C8929 TTE W OR WO FOL WCON,DOPPLER: HCPCS

## 2020-12-08 PROCEDURE — 6360000004 HC RX CONTRAST MEDICATION: Performed by: INTERNAL MEDICINE

## 2020-12-08 RX ADMIN — PERFLUTREN 2.2 MG: 6.52 INJECTION, SUSPENSION INTRAVENOUS at 14:31

## 2020-12-08 NOTE — PATIENT INSTRUCTIONS
Plan:  1. Smoking cessation discussed  2. Re-establish with coumadin clinic  3.  Follow up in 6 months

## 2021-03-18 ENCOUNTER — ANTI-COAG VISIT (OUTPATIENT)
Dept: PHARMACY | Age: 61
End: 2021-03-18
Payer: MEDICARE

## 2021-03-18 LAB — INTERNATIONAL NORMALIZATION RATIO, POC: 2

## 2021-03-18 PROCEDURE — 85610 PROTHROMBIN TIME: CPT

## 2021-03-18 PROCEDURE — 99211 OFF/OP EST MAY X REQ PHY/QHP: CPT

## 2021-03-18 NOTE — PROGRESS NOTES
Mr. Marguerite Blair is here for management of anticoagulation for Afib. Pt started on Coumadin in 2013. He presents today w/out complaint. Pt verified dosing regimen. PMH significant DMII, HTN, and chronic back pain. Pt denies s/s bleeding/bruising/swelling/SOB. Pt states he did not miss any doses. No changes in Rx/OTCs/Herbal medications. Pt takes his Warfarin in the AM.   Pt has occasional EtOH use; occasional smoker. Pt has not been here since Feb of 2020. Hx of noncompliance with appts. Patient says he currently has 10 mg tablets at home is is taking 10 mg daily. Showed patient the warfarin color/dosage diagram and verbally confirmed with him that the tablet is white in color. Advised patient to continue with 1 tablet daily. INR 2.0 is within acceptable therapeutic range of 2-3. Recommend to continue 10 mg daily. Patient has 10 mg tablets  Will continue to monitor and check INR in 3 weeks. Dosing reminder card given with phone number, appointment date and time.    Return to clinic: 4/8 @ 1134   Referring physician: Yusuf Buckner PharmD  3/18/2021 at 10:29 AM

## 2021-08-30 NOTE — PROGRESS NOTES
[address]   Advanced Heart Failure/Pulmonary Hypertension  Cardiac Evaluation      Jennifer Qureshi  YOB: 1960    Date of Visit:  8/31/21    Chief Complaint   Patient presents with    Follow-up    Congestive Heart Failure         History of Present Illness:  Jennifer Qurehsi is a 63 y.o. male who presents for follow up for CHF. He was admitted from 12/26/16-01/01/17 to [facility] with c/o sob and CHA on going since October this year. Pt was seen by PCP who treated him for pneumonia. 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 He is having to sit up most of the night as a result of persistent nocturnal coughing fits. He report snoring and problem having a sufficient sleep at night, PCP has been talking to him about getting a sleep study done. Pt is an active smoker, uses inhaler at home but unaware of any formal diagnosis of COPD. He is morbidly obese with chronic venous edema/stasis of BL lower ext which has not changed much from baseline. Pt denies any chest pain, no hx of CAD or CHF. Pt was found to be hypoxic in the mid 80s on RA which is new. CTA chest was negative for PE but showed bilateral areas of opacities, WBC was normal. Pt has baseline hx of HTN, Afib and obesity. He says that his swelling in legs is better but has significant abdominal swelling. He is on coumadin which is managed by his PCP. Echo done on 5/11/18 shows EF 55% (see report below). On 06/16/20 his weight was up 20 lbs since 09/2019. He reports poor diet choices during the COVID 19 pandemic shut down. His echo from 12/08/20 showed RV dilated and normal EF of 50%. On 12/08/20 he reported he was unable to use the CPAP. Today he reports his weight is down 11 lbs with better diet choices. He denies CP, SOB, dizziness or syncope.          Allergies   Allergen Reactions    Flecainide      Rash and swelling     Current Outpatient Medications   Medication Sig Dispense Refill    cetirizine (ZYRTEC) 10 MG tablet Take 10 mg by mouth daily      diclofenac sodium (VOLTAREN) 1 % GEL Apply 4 g topically 4 times daily      LORazepam (ATIVAN) 1 MG tablet Take 1 mg by mouth as needed.  metFORMIN (GLUCOPHAGE-XR) 750 MG extended release tablet Take 750 mg by mouth 2 times daily      montelukast (SINGULAIR) 10 MG tablet Take 10 mg by mouth daily      tiZANidine (ZANAFLEX) 4 MG tablet Take 4 mg by mouth as needed      atorvastatin (LIPITOR) 40 MG tablet Take 40 mg by mouth daily      lisinopril (PRINIVIL;ZESTRIL) 40 MG tablet Take 40 mg by mouth daily      gabapentin (NEURONTIN) 300 MG capsule Take 300 mg by mouth. PRN      spironolactone (ALDACTONE) 25 MG tablet TAKE 1 TABLET BY MOUTH DAILY 30 tablet 5    albuterol sulfate HFA (VENTOLIN HFA) 108 (90 BASE) MCG/ACT inhaler Inhale 2 puffs into the lungs every 6 hours as needed for Wheezing or Shortness of Breath 1 Inhaler 6    furosemide (LASIX) 40 MG tablet Take 1 tablet by mouth 2 times daily 60 tablet 0    metoprolol tartrate (LOPRESSOR) 50 MG tablet Take 50 mg by mouth 2 times daily      oxyCODONE-acetaminophen (PERCOCET) 5-325 MG per tablet Take 1 tablet by mouth every 8 hours as needed.  diltiazem (DILACOR XR) 240 MG XR capsule Take 240 mg by mouth daily.  cloNIDine (CATAPRES) 0.1 MG tablet Take 0.1 mg by mouth 2 times daily.  warfarin (COUMADIN) 5 MG tablet   Take 5 mg by mouth daily 10mg Q Tues-Sunday and 7.5mg Q Monday       No current facility-administered medications for this visit. Past Medical History:   Diagnosis Date    Asthma     allergy induced    Atrial fibrillation ([facility]     Diabetes mellitus ([facility]     Hypertension     Panic     Pneumonia      History reviewed. No pertinent surgical history.   Family History   Problem Relation Age of Onset    Heart Disease Mother     High Blood Pressure Mother     Cancer Father Social History     Socioeconomic History    Marital status: Single     Spouse name: Not on file    Number of children: Not on file    Years of education: Not on file    Highest education level: Not on file   Occupational History    Not on file   Tobacco Use    Smoking status: Current Every Day Smoker     Packs/day: 0.25     Years: 40.00     Pack years: 10.00     Types: Cigarettes    Smokeless tobacco: Never Used    Tobacco comment: pack every 3 to 4 week   Vaping Use    Vaping Use: Never used   Substance and Sexual Activity    Alcohol use: Yes     Comment: a couple times a week    Drug use: No    Sexual activity: Not on file   Other Topics Concern    Not on file   Social History Narrative    Not on file     Social Determinants of Health     Financial Resource Strain:     Difficulty of Paying Living Expenses:    Food Insecurity:     Worried About Running Out of Food in the Last Year:     Ran Out of Food in the Last Year:    Transportation Needs:     Lack of Transportation (Medical):  Lack of Transportation (Non-Medical):    Physical Activity:     Days of Exercise per Week:     Minutes of Exercise per Session:    Stress:     Feeling of Stress :    Social Connections:     Frequency of Communication with Friends and Family:     Frequency of Social Gatherings with Friends and Family:     Attends Sikhism Services:     Active Member of Clubs or Organizations:     Attends Club or Organization Meetings:     Marital Status:    Intimate Partner Violence:     Fear of Current or Ex-Partner:     Emotionally Abused:     Physically Abused:     Sexually Abused:        Review of Systems:   · Constitutional: there has been no unanticipated weight loss. There's been no change in energy level, sleep pattern, or activity level. · Eyes: No visual changes or diplopia. No scleral icterus. · ENT: No Headaches, hearing loss or vertigo. No mouth sores or sore throat.   · Cardiovascular: Reviewed in HPI  · Respiratory: No cough or wheezing, no sputum production. No hematemesis. · Gastrointestinal: No abdominal pain, appetite loss, blood in stools. No change in bowel or bladder habits. · Genitourinary: No dysuria, trouble voiding, or hematuria. · Musculoskeletal:  No gait disturbance, weakness or joint complaints. · Integumentary: No rash or pruritis. · Neurological: No headache, diplopia, change in muscle strength, numbness or tingling. No change in gait, balance, coordination, mood, affect, memory, mentation, behavior. · Psychiatric: No anxiety, no depression. · Endocrine: No malaise, fatigue or temperature intolerance. No excessive thirst, fluid intake, or urination. No tremor. · Hematologic/Lymphatic: No abnormal bruising or bleeding, blood clots or swollen lymph nodes. · Allergic/Immunologic: No nasal congestion or hives. Physical Examination:    Vitals:    08/31/21 1133   BP: 116/70   Pulse: 83   SpO2: 92%   Weight: (!) 326 lb 8 oz (148.1 kg)   Height: 6' 3\" (1.905 m)     Body mass index is 40.81 kg/m². Wt Readings from Last 3 Encounters:   08/31/21 (!) 326 lb 8 oz (148.1 kg)   12/08/20 (!) 337 lb 8 oz (153.1 kg)   06/16/20 (!) 337 lb (152.9 kg)     BP Readings from Last 3 Encounters:   08/31/21 116/70   12/08/20 120/84   06/16/20 (!) 146/99            Constitutional and General Appearance:   WD/WN in NAD  HEENT:  NC/AT  Skin:  Warm, dry  Respiratory:  · Normal excursion and expansion without use of accessory muscles  · Resp Auscultation: Normal breath sounds without dullness  Cardiovascular:  · The apical impulses not displaced  · Heart tones are crisp and normal  · Cervical veins are not engorged  · The carotid upstroke is normal in amplitude and contour without delay or bruit  · JVP 9-10 cm H2O  Irregularly irregular rhythm with nl S1 and S2 without m,r,g  · Peripheral pulses are symmetrical and full  · There is no clubbing, cyanosis of the extremities.   · 1+ bilateral edema below the knees  · Femoral Arteries: 2+ and equal  · Pedal Pulses: 2+ and equal   Neck:  · JVP minimally elevated  · No thyromegaly  Abdomen:  · No masses or tenderness  · Liver/Spleen: No Abnormalities Noted  Neurological/Psychiatric:  · Alert and oriented in all spheres  · Moves all extremities well  · Exhibits normal gait balance and coordination  · No abnormalities of mood, affect, memory, mentation, or behavior are noted    Echo: 05/12/18   Normal left ventricle systolic function with an estimated ejection fraction   of 55%. No regional wall motion abnormalities are seen. Elevated left ventricular diastolic filling pressure. The left atrium is mildly dilated. The right atrium and ventricle are moderately dilated. Mild mitral and tricuspid regurgitation. Systolic pulmonary artery pressure (SPAP) estimated at 41 mmHg (RA pressure   8 mmHg), consistent with mild pulmonary hypertension. Atrial fibrillation throughout the study. Echo: 12/08/20   Technically difficult examination. Low normal systolic function with an estimated ejection fraction of 50%. Global left ventricular hypokinesis. Definity contrast administered with no evidence of left ventricular mass or   thrombus noted. Elevated left ventricular diastolic filling pressure. Mild bi-atrial enlargement. The right ventricle is appears dilated with normal function. Systolic pulmonary artery pressure (SPAP) is normal and estimated at 35mmHg   (right atrial pressure 8 mmHg). Atrial fibrillation throughout the study. Lipids: 03/2019: , LDL 88, HDL 42,     Labs were reviewed including labs from other hospital systems through [facility]. Cardiac testing was reviewed including echos, nuclear scans, cardiac catheterization, including from other hospital systems through [facility]. Assessment:    1. Chronic diastolic heart failure ([address]    2. Essential hypertension    3.  Permanent atrial fibrillation ([address] 4. Mixed hyperlipidemia             Plan:  1. Smoking cessation discussed  2. Continue current medications  3. CBC, CMP, BNP  4. Follow up in 6 months      Scribe's attestation: This note was scribed in the presence of Pratibha Dover M.D. By Heena Matthew RN    The scribe's documentation has been prepared under my direction and personally reviewed by me in its entirety. I confirm that the note above accurately reflects all work, treatment, procedures, and medical decision making performed by me. Time Based Itemization  A total of 30 minutes was spent on today's patient encounter. If applicable, non-patient-facing activities:  ( x)Preparing to see the patient and reviewing records  ( ) Individual interpretation of results  ( ) Discussion or coordination of care with other health care professionals  ( x) Ordering of unique tests, medications, or procedures  ( x) Documentation within the EHR       I appreciate the opportunity of cooperating in the care of this patient.     Pratibha Dover M.D., [facility] [place]

## 2021-08-31 ENCOUNTER — ANTI-COAG VISIT (OUTPATIENT)
Dept: PHARMACY | Age: 61
End: 2021-08-31
Payer: MEDICARE

## 2021-08-31 ENCOUNTER — OFFICE VISIT (OUTPATIENT)
Dept: CARDIOLOGY CLINIC | Age: 61
End: 2021-08-31
Payer: MEDICARE

## 2021-08-31 VITALS
DIASTOLIC BLOOD PRESSURE: 70 MMHG | WEIGHT: 315 LBS | HEIGHT: 75 IN | SYSTOLIC BLOOD PRESSURE: 116 MMHG | BODY MASS INDEX: 39.17 KG/M2 | HEART RATE: 83 BPM | OXYGEN SATURATION: 92 %

## 2021-08-31 DIAGNOSIS — E78.2 MIXED HYPERLIPIDEMIA: ICD-10-CM

## 2021-08-31 DIAGNOSIS — I10 ESSENTIAL HYPERTENSION: ICD-10-CM

## 2021-08-31 DIAGNOSIS — I50.32 CHRONIC DIASTOLIC HEART FAILURE (HCC): Primary | ICD-10-CM

## 2021-08-31 DIAGNOSIS — I48.21 PERMANENT ATRIAL FIBRILLATION (HCC): ICD-10-CM

## 2021-08-31 LAB — INR BLD: 1.8

## 2021-08-31 PROCEDURE — 85610 PROTHROMBIN TIME: CPT | Performed by: FAMILY MEDICINE

## 2021-08-31 PROCEDURE — 99211 OFF/OP EST MAY X REQ PHY/QHP: CPT | Performed by: FAMILY MEDICINE

## 2021-08-31 PROCEDURE — 99214 OFFICE O/P EST MOD 30 MIN: CPT | Performed by: INTERNAL MEDICINE

## 2021-08-31 RX ORDER — METFORMIN HYDROCHLORIDE 750 MG/1
750 TABLET, EXTENDED RELEASE ORAL 2 TIMES DAILY
COMMUNITY
Start: 2021-08-23

## 2021-08-31 RX ORDER — MONTELUKAST SODIUM 10 MG/1
10 TABLET ORAL DAILY
COMMUNITY
Start: 2021-04-07

## 2021-08-31 RX ORDER — CETIRIZINE HYDROCHLORIDE 10 MG/1
10 TABLET ORAL DAILY
COMMUNITY
Start: 2021-04-07

## 2021-08-31 RX ORDER — LORAZEPAM 1 MG/1
1 TABLET ORAL PRN
COMMUNITY
Start: 2021-08-31 | End: 2021-09-28

## 2021-08-31 RX ORDER — TIZANIDINE 4 MG/1
4 TABLET ORAL PRN
COMMUNITY
Start: 2021-06-30

## 2021-08-31 NOTE — PATIENT INSTRUCTIONS
Plan:  1. Smoking cessation discussed  2. Continue current medications  3. CBC, CMP, BNP  4.  Follow up in 6 months

## 2021-08-31 NOTE — LETTER
[address]   Advanced Heart Failure/Pulmonary Hypertension  Cardiac Evaluation      Elizabeth Leon  YOB: 1960    Date of Visit:  8/31/21    Chief Complaint   Patient presents with    Follow-up    Congestive Heart Failure         History of Present Illness:  Elizabeth Leon is a 63 y.o. male who presents for follow up for CHF. He was admitted from 12/26/16-01/01/17 to [facility] with c/o sob and CHA on going since October this year. Pt was seen by PCP who treated him for pneumonia. 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 He is having to sit up most of the night as a result of persistent nocturnal coughing fits. He report snoring and problem having a sufficient sleep at night, PCP has been talking to him about getting a sleep study done. Pt is an active smoker, uses inhaler at home but unaware of any formal diagnosis of COPD. He is morbidly obese with chronic venous edema/stasis of BL lower ext which has not changed much from baseline. Pt denies any chest pain, no hx of CAD or CHF. Pt was found to be hypoxic in the mid 80s on RA which is new. CTA chest was negative for PE but showed bilateral areas of opacities, WBC was normal. Pt has baseline hx of HTN, Afib and obesity. He says that his swelling in legs is better but has significant abdominal swelling. He is on coumadin which is managed by his PCP. Echo done on 5/11/18 shows EF 55% (see report below). On 06/16/20 his weight was up 20 lbs since 09/2019. He reports poor diet choices during the COVID 19 pandemic shut down. His echo from 12/08/20 showed RV dilated and normal EF of 50%. On 12/08/20 he reported he was unable to use the CPAP. Today he reports his weight is down 11 lbs with better diet choices. He denies CP, SOB, dizziness or syncope.          Allergies   Allergen Reactions    Flecainide      Rash and swelling     Current Outpatient Medications   Medication Sig Dispense Refill    cetirizine (ZYRTEC) 10 MG tablet Take 10 mg by mouth daily      diclofenac sodium (VOLTAREN) 1 % GEL Apply 4 g topically 4 times daily      LORazepam (ATIVAN) 1 MG tablet Take 1 mg by mouth as needed.  metFORMIN (GLUCOPHAGE-XR) 750 MG extended release tablet Take 750 mg by mouth 2 times daily      montelukast (SINGULAIR) 10 MG tablet Take 10 mg by mouth daily      tiZANidine (ZANAFLEX) 4 MG tablet Take 4 mg by mouth as needed      atorvastatin (LIPITOR) 40 MG tablet Take 40 mg by mouth daily      lisinopril (PRINIVIL;ZESTRIL) 40 MG tablet Take 40 mg by mouth daily      gabapentin (NEURONTIN) 300 MG capsule Take 300 mg by mouth. PRN      spironolactone (ALDACTONE) 25 MG tablet TAKE 1 TABLET BY MOUTH DAILY 30 tablet 5    albuterol sulfate HFA (VENTOLIN HFA) 108 (90 BASE) MCG/ACT inhaler Inhale 2 puffs into the lungs every 6 hours as needed for Wheezing or Shortness of Breath 1 Inhaler 6    furosemide (LASIX) 40 MG tablet Take 1 tablet by mouth 2 times daily 60 tablet 0    metoprolol tartrate (LOPRESSOR) 50 MG tablet Take 50 mg by mouth 2 times daily      oxyCODONE-acetaminophen (PERCOCET) 5-325 MG per tablet Take 1 tablet by mouth every 8 hours as needed.  diltiazem (DILACOR XR) 240 MG XR capsule Take 240 mg by mouth daily.  cloNIDine (CATAPRES) 0.1 MG tablet Take 0.1 mg by mouth 2 times daily.  warfarin (COUMADIN) 5 MG tablet   Take 5 mg by mouth daily 10mg Q Tues-Sunday and 7.5mg Q Monday       No current facility-administered medications for this visit. Past Medical History:   Diagnosis Date    Asthma     allergy induced    At[facility]     Diabetes mellitus ([facility]     Hypertension     Panic     Pneumonia      History reviewed. No pertinent surgical history.   Family History   Problem Relation Age of Onset    Heart Disease Mother     High Blood Pressure Mother     Cancer Father      Social History     Socioeconomic History    Marital status: Single     Spouse name: Not on file    Number of children: Not on file    Years of education: Not on file    Highest education level: Not on file   Occupational History    Not on file   Tobacco Use    Smoking status: Current Every Day Smoker     Packs/day: 0.25     Years: 40.00     Pack years: 10.00     Types: Cigarettes    Smokeless tobacco: Never Used    Tobacco comment: pack every 3 to 4 week   Vaping Use    Vaping Use: Never used   Substance and Sexual Activity    Alcohol use: Yes     Comment: a couple times a week    Drug use: No    Sexual activity: Not on file   Other Topics Concern    Not on file   Social History Narrative    Not on file     Social Determinants of Health     Financial Resource Strain:     Difficulty of Paying Living Expenses:    Food Insecurity:     Worried About Running Out of Food in the Last Year:     Ran Out of Food in the Last Year:    Transportation Needs:     Lack of Transportation (Medical):  Lack of Transportation (Non-Medical):    Physical Activity:     Days of Exercise per Week:     Minutes of Exercise per Session:    Stress:     Feeling of Stress :    Social Connections:     Frequency of Communication with Friends and Family:     Frequency of Social Gatherings with Friends and Family:     Attends Protestant Services:     Active Member of Clubs or Organizations:     Attends Club or Organization Meetings:     Marital Status:    Intimate Partner Violence:     Fear of Current or Ex-Partner:     Emotionally Abused:     Physically Abused:     Sexually Abused:        Review of Systems:   · Constitutional: there has been no unanticipated weight loss. There's been no change in energy level, sleep pattern, or activity level. · Eyes: No visual changes or diplopia. No scleral icterus. · ENT: No Headaches, hearing loss or vertigo. No mouth sores or sore throat.   · Cardiovascular: Reviewed in HPI  · Respiratory: No cough or wheezing, no sputum production. No hematemesis. · Gastrointestinal: No abdominal pain, appetite loss, blood in stools. No change in bowel or bladder habits. · Genitourinary: No dysuria, trouble voiding, or hematuria. · Musculoskeletal:  No gait disturbance, weakness or joint complaints. · Integumentary: No rash or pruritis. · Neurological: No headache, diplopia, change in muscle strength, numbness or tingling. No change in gait, balance, coordination, mood, affect, memory, mentation, behavior. · Psychiatric: No anxiety, no depression. · Endocrine: No malaise, fatigue or temperature intolerance. No excessive thirst, fluid intake, or urination. No tremor. · Hematologic/Lymphatic: No abnormal bruising or bleeding, blood clots or swollen lymph nodes. · Allergic/Immunologic: No nasal congestion or hives. Physical Examination:    Vitals:    08/31/21 1133   BP: 116/70   Pulse: 83   SpO2: 92%   Weight: (!) 326 lb 8 oz (148.1 kg)   Height: 6' 3\" (1.905 m)     Body mass index is 40.81 kg/m². Wt Readings from Last 3 Encounters:   08/31/21 (!) 326 lb 8 oz (148.1 kg)   12/08/20 (!) 337 lb 8 oz (153.1 kg)   06/16/20 (!) 337 lb (152.9 kg)     BP Readings from Last 3 Encounters:   08/31/21 116/70   12/08/20 120/84   06/16/20 (!) 146/99            Constitutional and General Appearance:   WD/WN in NAD  HEENT:  NC/AT  Skin:  Warm, dry  Respiratory:  · Normal excursion and expansion without use of accessory muscles  · Resp Auscultation: Normal breath sounds without dullness  Cardiovascular:  · The apical impulses not displaced  · Heart tones are crisp and normal  · Cervical veins are not engorged  · The carotid upstroke is normal in amplitude and contour without delay or bruit  · JVP 9-10 cm H2O  Irregularly irregular rhythm with nl S1 and S2 without m,r,g  · Peripheral pulses are symmetrical and full  · There is no clubbing, cyanosis of the extremities.   · 1+ bilateral edema below the knees  · Femoral Arteries: 2+ and equal  · Pedal Pulses: 2+ and equal   Neck:  · JVP minimally elevated  · No thyromegaly  Abdomen:  · No masses or tenderness  · Liver/Spleen: No Abnormalities Noted  Neurological/Psychiatric:  · Alert and oriented in all spheres  · Moves all extremities well  · Exhibits normal gait balance and coordination  · No abnormalities of mood, affect, memory, mentation, or behavior are noted    Echo: 05/12/18   Normal left ventricle systolic function with an estimated ejection fraction   of 55%. No regional wall motion abnormalities are seen. Elevated left ventricular diastolic filling pressure. The left atrium is mildly dilated. The right atrium and ventricle are moderately dilated. Mild mitral and tricuspid regurgitation. Systolic pulmonary artery pressure (SPAP) estimated at 41 mmHg (RA pressure   8 mmHg), consistent with mild pulmonary hypertension. Atrial fibrillation throughout the study. Echo: 12/08/20   Technically difficult examination. Low normal systolic function with an estimated ejection fraction of 50%. Global left ventricular hypokinesis. Definity contrast administered with no evidence of left ventricular mass or   thrombus noted. Elevated left ventricular diastolic filling pressure. Mild bi-atrial enlargement. The right ventricle is appears dilated with normal function. Systolic pulmonary artery pressure (SPAP) is normal and estimated at 35mmHg   (right atrial pressure 8 mmHg). Atrial fibrillation throughout the study. Lipids: 03/2019: , LDL 88, HDL 42,     Labs were reviewed including labs from other hospital systems through [facility]. Cardiac testing was reviewed including echos, nuclear scans, cardiac catheterization, including from other hospital systems through [facility]. Assessment:    1. Chronic diastolic heart failure ([address]    2. Essential hypertension    3.  Permanent atrial fibrillation ([facility] [address]    4. Mixed hyperlipidemia             Plan:  1. Smoking cessation discussed  2. Continue current medications  3. CBC, CMP, BNP  4. Follow up in 6 months      Scribe's attestation: This note was scribed in the presence of Maurice Denise M.D. By Louise Mesa RN    The scribe's documentation has been prepared under my direction and personally reviewed by me in its entirety. I confirm that the note above accurately reflects all work, treatment, procedures, and medical decision making performed by me. Time Based Itemization  A total of 30 minutes was spent on today's patient encounter. If applicable, non-patient-facing activities:  ( x)Preparing to see the patient and reviewing records  ( ) Individual interpretation of results  ( ) Discussion or coordination of care with other health care professionals  ( x) Ordering of unique tests, medications, or procedures  ( x) Documentation within the EHR       I appreciate the opportunity of cooperating in the care of this patient.     Maurice Denise M.D., [facility]

## 2021-08-31 NOTE — PROGRESS NOTES
Mr. Kaylene Rudolph is here for management of anticoagulation for Afib. Pt started on Coumadin in 2013. He presents today w/out complaint. Pt verified dosing regimen. PMH significant DMII, HTN, and chronic back pain. Pt denies s/s bleeding/bruising/swelling/SOB. Pt states he did not miss any doses. No changes in Rx/OTCs/Herbal medications. Pt takes his warfarin in the AM.   Pt has occasional EtOH use; occasional smoker. Hx of noncompliance with appts. Pt reported he now takes muscle relaxer (does not know name of med). INR 1.8 is below acceptable therapeutic range of 2-3. Recommend to take 15 mg today, then continue 10 mg daily. Patient has 10 mg tablets  Will continue to monitor and check INR in 2 weeks. Dosing reminder card given with phone number, appointment date and time.    Return to clinic: 9/14 @     Referring physician: Jesse GastelumD Student 2022 8/31/2021 10:38 AM    I have seen the patient and I agree with the assessment and plan created by the PharmD Candidate  Zahraa Johnson PharmD 8/31/2021 10:50 AM

## 2022-04-22 NOTE — PROGRESS NOTES
[address]   Advanced Heart Failure/Pulmonary Hypertension  Cardiac Evaluation      Milagros Bowie  YOB: 1960    Date of Visit:  4/26/22    Chief Complaint   Patient presents with    Follow-up    Congestive Heart Failure        History of Present Illness:  Milagros Bowie is a 57 y.o. male who presents for follow up for CHF. He was admitted from 12/26/16-01/01/17 to [facility] with c/o sob and CHA on going since October this year. Pt was seen by PCP who treated him for pneumonia. 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 He is having to sit up most of the night as a result of persistent nocturnal coughing fits. He report snoring and problem having a sufficient sleep at night, PCP has been talking to him about getting a sleep study done. Pt is an active smoker, uses inhaler at home but unaware of any formal diagnosis of COPD. He is morbidly obese with chronic venous edema/stasis of BL lower ext which has not changed much from baseline. Pt denies any chest pain, no hx of CAD or CHF. Pt was found to be hypoxic in the mid 80s on RA which is new. CTA chest was negative for PE but showed bilateral areas of opacities, WBC was normal. Pt has baseline hx of HTN, Afib and obesity. He says that his swelling in legs is better but has significant abdominal swelling. He is on coumadin which is managed by his PCP. Echo done on 5/11/18 shows EF 55% (see report below). On 06/16/20 his weight was up 20 lbs since 09/2019. He reports poor diet choices during the COVID 19 pandemic shut down. His echo from 12/08/20 showed RV dilated and normal EF of 50%. On 12/08/20 he reported he was unable to use the CPAP. At [address] 8/31/2021 he reported his weight was down 11 lbs with better diet choices. Today, 4/26/2022, patient reports overall feeling well.  Patient has been continuing to lose weight through diet and exercise. He states his breathing has been stable. He follows with Dr. Saad Glynn every three months. Patient denies current edema, chest pain, sob, palpitations, dizziness or syncope. Patient is taking all cardiac medications as prescribed and tolerates them well. He reports his PCP added Farxiga to assist with his diabetes management. Allergies   Allergen Reactions    Fluticasone-Salmeterol Shortness Of Breath     Afib per pt.  Flecainide      Rash and swelling     Current Outpatient Medications   Medication Sig Dispense Refill    dapagliflozin (FARXIGA) 10 MG tablet TAKE 1 TABLET BY MOUTH DAILY      SYMBICORT 160-4.5 MCG/ACT AERO INHALE 2 PUFFS BY MOUTH TWICE DAILY      LORazepam (ATIVAN) 1 MG tablet       cetirizine (ZYRTEC) 10 MG tablet Take 10 mg by mouth daily      diclofenac sodium (VOLTAREN) 1 % GEL Apply 4 g topically 4 times daily      metFORMIN (GLUCOPHAGE-XR) 750 MG extended release tablet Take 750 mg by mouth 2 times daily      montelukast (SINGULAIR) 10 MG tablet Take 10 mg by mouth daily      tiZANidine (ZANAFLEX) 4 MG tablet Take 4 mg by mouth as needed      atorvastatin (LIPITOR) 40 MG tablet Take 40 mg by mouth daily      lisinopril (PRINIVIL;ZESTRIL) 40 MG tablet Take 40 mg by mouth daily      spironolactone (ALDACTONE) 25 MG tablet TAKE 1 TABLET BY MOUTH DAILY 30 tablet 5    albuterol sulfate HFA (VENTOLIN HFA) 108 (90 BASE) MCG/ACT inhaler Inhale 2 puffs into the lungs every 6 hours as needed for Wheezing or Shortness of Breath 1 Inhaler 6    furosemide (LASIX) 40 MG tablet Take 1 tablet by mouth 2 times daily 60 tablet 0    metoprolol tartrate (LOPRESSOR) 50 MG tablet Take 50 mg by mouth 2 times daily      oxyCODONE-acetaminophen (PERCOCET) 5-325 MG per tablet Take 1 tablet by mouth every 8 hours as needed.  diltiazem (DILACOR XR) 240 MG XR capsule Take 240 mg by mouth daily.       cloNIDine (CATAPRES) 0.1 MG tablet Take 0.1 mg by mouth 2 times daily.  warfarin (COUMADIN) 5 MG tablet   Take 5 mg by mouth daily 10mg Q Tues-Sunday and 7.5mg Q Monday       No current facility-administered medications for this visit. Past Medical History:   Diagnosis Date    Asthma     allergy induced    Atrial fibrillation ([facility]     Diabetes mellitus ([facility]     Hypertension     Panic     Pneumonia      History reviewed. No pertinent surgical history. Family History   Problem Relation Age of Onset    Heart Disease Mother     High Blood Pressure Mother     Cancer Father      Social History     Socioeconomic History    Marital status: Single     Spouse name: Not on file    Number of children: Not on file    Years of education: Not on file    Highest education level: Not on file   Occupational History    Not on file   Tobacco Use    Smoking status: Current Every Day Smoker     Packs/day: 0.25     Years: 40.00     Pack years: 10.00     Types: Cigarettes    Smokeless tobacco: Never Used    Tobacco comment: pack every 3 to 4 week   Vaping Use    Vaping Use: Never used   Substance and Sexual Activity    Alcohol use: Yes     Alcohol/week: 4.0 standard drinks     Types: 4 Standard drinks or equivalent per week    Drug use: No    Sexual activity: Not on file   Other Topics Concern    Not on file   Social History Narrative    Not on file     Social Determinants of Health     Financial Resource Strain:     Difficulty of Paying Living Expenses: Not on file   Food Insecurity:     Worried About Running Out of Food in the Last Year: Not on file    Ricky of Food in the Last Year: Not on file   Transportation Needs:     Lack of Transportation (Medical): Not on file    Lack of Transportation (Non-Medical):  Not on file   Physical Activity:     Days of Exercise per Week: Not on file    Minutes of Exercise per Session: Not on file   Stress:     Feeling of Stress : Not on file   Social Connections:     Frequency of Communication with Friends and Family: Not on file    Frequency of Social Gatherings with Friends and Family: Not on file    Attends [employer] Services: Not on file    Active Member of Clubs or Organizations: Not on file    Attends Club or Organization Meetings: Not on file    Marital Status: Not on file   Intimate Partner Violence:     Fear of Current or Ex-Partner: Not on file    Emotionally Abused: Not on file    Physically Abused: Not on file    Sexually Abused: Not on file   Housing Stability:     Unable to Pay for Housing in the Last Year: Not on file    Number of [place] in the Last Year: Not on file    Unstable Housing in the Last Year: Not on file       Review of Systems:   · Constitutional: there has been no unanticipated weight loss. There's been no change in energy level, sleep pattern, or activity level. · Eyes: No visual changes or diplopia. No scleral icterus. · ENT: No Headaches, hearing loss or vertigo. No mouth sores or sore throat. · Cardiovascular: Reviewed in HPI  · Respiratory: No cough or wheezing, no sputum production. No hematemesis. · Gastrointestinal: No abdominal pain, appetite loss, blood in stools. No change in bowel or bladder habits. · Genitourinary: No dysuria, trouble voiding, or hematuria. · Musculoskeletal:  No gait disturbance, weakness or joint complaints. · Integumentary: No rash or pruritis. · Neurological: No headache, diplopia, change in muscle strength, numbness or tingling. No change in gait, balance, coordination, mood, affect, memory, mentation, behavior. · Psychiatric: No anxiety, no depression. · Endocrine: No malaise, fatigue or temperature intolerance. No excessive thirst, fluid intake, or urination. No tremor. · Hematologic/Lymphatic: No abnormal bruising or bleeding, blood clots or swollen lymph nodes. · Allergic/Immunologic: No nasal congestion or hives.     Physical Examination:    Vitals:    04/26/22 1036   BP: 108/64   Pulse: 82   SpO2: 96%   Weight: (!) 310 lb (140.6 kg)   Height: 6' 3\" (1.905 m)     Body mass index is 38.75 kg/m². Wt Readings from Last 3 Encounters:   04/26/22 (!) 310 lb (140.6 kg)   08/31/21 (!) 326 lb 8 oz (148.1 kg)   12/08/20 (!) 337 lb 8 oz (153.1 kg)     BP Readings from Last 3 Encounters:   04/26/22 108/64   08/31/21 116/70   12/08/20 120/84         Constitutional and General Appearance:   WD/WN in NAD  HEENT:  NC/AT  Skin:  Warm, dry  Respiratory:  · Normal excursion and expansion without use of accessory muscles  · Resp Auscultation: Normal breath sounds without dullness  Cardiovascular:  · The apical impulses not displaced  · Heart tones are crisp and normal  · Cervical veins are not engorged  · The carotid upstroke is normal in amplitude and contour without delay or bruit  · JVP 9-10 cm H2O  Irregularly irregular rhythm with nl S1 and S2 without m,r,g  · Peripheral pulses are symmetrical and full  · There is no clubbing, cyanosis of the extremities. · 1+ bilateral edema below the knees  · Femoral Arteries: 2+ and equal  · Pedal Pulses: 2+ and equal   Neck:  · JVP minimally elevated  · No thyromegaly  Abdomen:  · No masses or tenderness  · Liver/Spleen: No Abnormalities Noted  Neurological/Psychiatric:  · Alert and oriented in all spheres  · Moves all extremities well  · Exhibits normal gait balance and coordination  · No abnormalities of mood, affect, memory, mentation, or behavior are noted    Echo: 05/12/18   Normal left ventricle systolic function with an estimated ejection fraction   of 55%. No regional wall motion abnormalities are seen. Elevated left ventricular diastolic filling pressure. The left atrium is mildly dilated. The right atrium and ventricle are moderately dilated. Mild mitral and tricuspid regurgitation. Systolic pulmonary artery pressure (SPAP) estimated at 41 mmHg (RA pressure   8 mmHg), consistent with mild pulmonary hypertension. Atrial fibrillation throughout the study.        Echo: 12/08/20 Technically difficult examination. Low normal systolic function with an estimated ejection fraction of 50%. Global left ventricular hypokinesis. Definity contrast administered with no evidence of left ventricular mass or   thrombus noted. Elevated left ventricular diastolic filling pressure. Mild bi-atrial enlargement. The right ventricle is appears dilated with normal function. Systolic pulmonary artery pressure (SPAP) is normal and estimated at 35mmHg   (right atrial pressure 8 mmHg). Atrial fibrillation throughout the study. Labs were reviewed including labs from other hospital systems through [facility]. Cardiac testing was reviewed including echos, nuclear scans, cardiac catheterization, including from other hospital systems through [facility]. Assessment:    1. Chronic diastolic heart failure ([address]    2. Essential hypertension    3. Permanent atrial fibrillation ([facility]    4. Mixed hyperlipidemia    5. Vitamin D deficiency         Plan:  1. Continue taking all cardiac medications as prescribed. 2. Follow with PCP for labwork  3. Follow up with me in 6-9 months       Scribe's attestation: This note was scribed in the presence of Alice Barba MD by Ag Ken RN      The scribe's documentation has been prepared under my direction and personally reviewed by me in its entirety. I confirm that the note above accurately reflects all work, treatment, procedures, and medical decision making performed by me. Time Based Itemization  A total of 30 minutes was spent on today's patient encounter.   If applicable, non-patient-facing activities:  ( x)Preparing to see the patient and reviewing records  ( ) Individual interpretation of results  ( ) Discussion or coordination of care with other health care professionals  ( x) Ordering of unique tests, medications, or procedures  ( x) Documentation within the EHR     I appreciate the opportunity of cooperating in the care of this patient.     Kael Sanchez M.D., [facility] [place]

## 2022-04-26 ENCOUNTER — ANTI-COAG VISIT (OUTPATIENT)
Dept: PHARMACY | Age: 62
End: 2022-04-26
Payer: MEDICARE

## 2022-04-26 ENCOUNTER — OFFICE VISIT (OUTPATIENT)
Dept: CARDIOLOGY CLINIC | Age: 62
End: 2022-04-26
Payer: MEDICARE

## 2022-04-26 VITALS
HEART RATE: 82 BPM | HEIGHT: 75 IN | OXYGEN SATURATION: 96 % | DIASTOLIC BLOOD PRESSURE: 64 MMHG | WEIGHT: 310 LBS | SYSTOLIC BLOOD PRESSURE: 108 MMHG | BODY MASS INDEX: 38.54 KG/M2

## 2022-04-26 DIAGNOSIS — I50.32 CHRONIC DIASTOLIC HEART FAILURE (HCC): Primary | ICD-10-CM

## 2022-04-26 DIAGNOSIS — I48.21 PERMANENT ATRIAL FIBRILLATION (HCC): ICD-10-CM

## 2022-04-26 DIAGNOSIS — E55.9 VITAMIN D DEFICIENCY: ICD-10-CM

## 2022-04-26 DIAGNOSIS — E78.2 MIXED HYPERLIPIDEMIA: ICD-10-CM

## 2022-04-26 DIAGNOSIS — I10 ESSENTIAL HYPERTENSION: ICD-10-CM

## 2022-04-26 LAB — INTERNATIONAL NORMALIZATION RATIO, POC: 2.2

## 2022-04-26 PROCEDURE — 99214 OFFICE O/P EST MOD 30 MIN: CPT | Performed by: INTERNAL MEDICINE

## 2022-04-26 PROCEDURE — 85610 PROTHROMBIN TIME: CPT

## 2022-04-26 PROCEDURE — 99211 OFF/OP EST MAY X REQ PHY/QHP: CPT

## 2022-04-26 RX ORDER — CLONIDINE HYDROCHLORIDE 0.2 MG/1
TABLET ORAL
COMMUNITY
Start: 2022-04-24 | End: 2022-04-26

## 2022-04-26 RX ORDER — BUDESONIDE AND FORMOTEROL FUMARATE DIHYDRATE 160; 4.5 UG/1; UG/1
AEROSOL RESPIRATORY (INHALATION)
COMMUNITY
Start: 2022-03-11

## 2022-04-26 RX ORDER — LORAZEPAM 1 MG/1
TABLET ORAL
COMMUNITY
Start: 2022-04-21

## 2022-04-26 RX ORDER — DILTIAZEM HYDROCHLORIDE 240 MG/1
CAPSULE, COATED, EXTENDED RELEASE ORAL
COMMUNITY
Start: 2022-02-21 | End: 2022-04-26

## 2022-04-26 NOTE — PATIENT INSTRUCTIONS
Plan:  1. Continue taking all cardiac medications as prescribed. 2. Follow with PCP for labwork  3.  Follow up with me in 6-9 months

## 2022-04-26 NOTE — PROGRESS NOTES
Mr. Dirk Calabrese is here for management of anticoagulation for Afib. Pt started on Coumadin in 2013. He presents today w/out complaint. Pt verified dosing regimen. PMH significant DMII, HTN, and chronic back pain. Pt denies s/s bleeding/bruising/swelling/SOB. Pt states he did not miss any doses. No changes in Rx/OTCs/Herbal medications. Pt takes his warfarin in the AM.   Pt has occasional EtOH use; occasional smoker. Hx of noncompliance with appts. Patient has not been in clinic since Aug 2021  Verified that patient is currently taking 10 mg daily. Patient starts on Farxiga    INR 2.2 is within acceptable therapeutic range of 2-3. Recommend to continue with warfarin 10 mg daily. Patient has 10 mg tablets  Will continue to monitor and check INR in 6 weeks (patient requested appt to be after June 3rd). Dosing reminder card given with phone number, appointment date and time.    Return to clinic: 6/7 @ 65   Referring physician: Maki Colón, PharmD  4/26/2022 at 11:48 AM

## 2022-06-21 ENCOUNTER — ANTI-COAG VISIT (OUTPATIENT)
Dept: PHARMACY | Age: 62
End: 2022-06-21
Payer: MEDICARE

## 2022-06-21 LAB — INR BLD: 2.1

## 2022-06-21 PROCEDURE — 85610 PROTHROMBIN TIME: CPT

## 2022-06-21 PROCEDURE — 99211 OFF/OP EST MAY X REQ PHY/QHP: CPT

## 2022-06-21 NOTE — PROGRESS NOTES
Mr. Jovon Marti is here for management of anticoagulation for Afib. Pt started on Coumadin in 2013. He presents today w/out complaint. Pt verified dosing regimen. PMH significant DMII, HTN, and chronic back pain. Pt denies s/s bleeding/bruising/swelling/SOB. Pt states he did not miss any doses. No changes in Rx/OTCs/Herbal medications. Pt takes his warfarin in the AM.   Pt has occasional EtOH use; occasional smoker. Hx of noncompliance with appts. Was off warfarin for a dental procedure and had to reschedule his appointment. Has been back on for at least 1 week now. INR 2.1 is within acceptable therapeutic range of 2-3. Recommend to continue with warfarin 10 mg daily. Patient has 10 mg tablets  Will continue to monitor and check INR in 6 weeks  Dosing reminder card given with phone number, appointment date and time.    Return to clinic: 8/2 @ 1100   Referring physician: Temo Kaiser, PharmD 6/21/22  10:08 AM

## 2022-08-02 ENCOUNTER — ANTI-COAG VISIT (OUTPATIENT)
Dept: PHARMACY | Age: 62
End: 2022-08-02
Payer: MEDICARE

## 2022-08-02 LAB — INTERNATIONAL NORMALIZATION RATIO, POC: 1.8

## 2022-08-02 PROCEDURE — 85610 PROTHROMBIN TIME: CPT

## 2022-08-02 PROCEDURE — 99211 OFF/OP EST MAY X REQ PHY/QHP: CPT

## 2022-08-02 NOTE — PROGRESS NOTES
MrJoselin Callahan is here for management of anticoagulation for Afib. Pt started on Coumadin in 2013. He presents today w/out complaint. Pt verified dosing regimen. PMH significant DMII, HTN, and chronic back pain. Pt denies s/s bleeding/bruising/swelling/SOB. Pt states he did not miss any doses. No changes in Rx/OTCs/Herbal medications. Pt takes his warfarin in the AM.   Pt has occasional EtOH use; occasional smoker. Hx of noncompliance with appts. No changes    INR 1.8 is slightly below the acceptable therapeutic range of 2-3. Recommend 15 mg today only and to continue with warfarin 10 mg daily. Patient has 10 mg tablets  Will continue to monitor and check INR in 5 weeks (per patient request)  Dosing reminder card given with phone number, appointment date and time.    Return to clinic: 9/6 @ 1100   Referring physician: Forrest Alcaraz, PharmD  8/2/2022 at 11:00 AM

## 2022-09-18 ENCOUNTER — APPOINTMENT (OUTPATIENT)
Dept: GENERAL RADIOLOGY | Age: 62
End: 2022-09-18
Payer: MEDICARE

## 2022-09-18 ENCOUNTER — HOSPITAL ENCOUNTER (EMERGENCY)
Age: 62
Discharge: HOME | End: 2022-09-18
Attending: EMERGENCY MEDICINE
Payer: MEDICARE

## 2022-09-18 VITALS
TEMPERATURE: 98.1 F | SYSTOLIC BLOOD PRESSURE: 128 MMHG | DIASTOLIC BLOOD PRESSURE: 76 MMHG | OXYGEN SATURATION: 95 % | BODY MASS INDEX: 37.14 KG/M2 | WEIGHT: 305 LBS | HEIGHT: 76 IN | HEART RATE: 82 BPM | RESPIRATION RATE: 16 BRPM

## 2022-09-18 DIAGNOSIS — L03.115 CELLULITIS OF RIGHT LEG: Primary | ICD-10-CM

## 2022-09-18 DIAGNOSIS — M79.89 RIGHT LEG SWELLING: ICD-10-CM

## 2022-09-18 LAB
A/G RATIO: 1.3 (ref 1.1–2.2)
ALBUMIN SERPL-MCNC: 4.1 G/DL (ref 3.4–5)
ALP BLD-CCNC: 66 U/L (ref 40–129)
ALT SERPL-CCNC: 27 U/L (ref 10–40)
ANION GAP SERPL CALCULATED.3IONS-SCNC: 12 MMOL/L (ref 3–16)
AST SERPL-CCNC: 33 U/L (ref 15–37)
BASOPHILS ABSOLUTE: 0.1 K/UL (ref 0–0.2)
BASOPHILS RELATIVE PERCENT: 1.1 %
BILIRUB SERPL-MCNC: 0.4 MG/DL (ref 0–1)
BUN BLDV-MCNC: 16 MG/DL (ref 7–20)
C-REACTIVE PROTEIN: 14.2 MG/L (ref 0–5.1)
CALCIUM SERPL-MCNC: 9.8 MG/DL (ref 8.3–10.6)
CHLORIDE BLD-SCNC: 94 MMOL/L (ref 99–110)
CO2: 27 MMOL/L (ref 21–32)
CREAT SERPL-MCNC: 0.7 MG/DL (ref 0.8–1.3)
D DIMER: <0.27 UG/ML FEU (ref 0–0.6)
EOSINOPHILS ABSOLUTE: 0.2 K/UL (ref 0–0.6)
EOSINOPHILS RELATIVE PERCENT: 2.2 %
GFR AFRICAN AMERICAN: >60
GFR NON-AFRICAN AMERICAN: >60
GLUCOSE BLD-MCNC: 137 MG/DL (ref 70–99)
HCT VFR BLD CALC: 52.6 % (ref 40.5–52.5)
HEMOGLOBIN: 17.1 G/DL (ref 13.5–17.5)
INR BLD: 3.21 (ref 0.87–1.14)
LACTIC ACID: 1.7 MMOL/L (ref 0.4–2)
LYMPHOCYTES ABSOLUTE: 1.7 K/UL (ref 1–5.1)
LYMPHOCYTES RELATIVE PERCENT: 25.3 %
MCH RBC QN AUTO: 31 PG (ref 26–34)
MCHC RBC AUTO-ENTMCNC: 32.6 G/DL (ref 31–36)
MCV RBC AUTO: 95.3 FL (ref 80–100)
MONOCYTES ABSOLUTE: 0.7 K/UL (ref 0–1.3)
MONOCYTES RELATIVE PERCENT: 10.8 %
NEUTROPHILS ABSOLUTE: 4.2 K/UL (ref 1.7–7.7)
NEUTROPHILS RELATIVE PERCENT: 60.6 %
PDW BLD-RTO: 14.5 % (ref 12.4–15.4)
PLATELET # BLD: 224 K/UL (ref 135–450)
PMV BLD AUTO: 7.7 FL (ref 5–10.5)
POTASSIUM SERPL-SCNC: 5 MMOL/L (ref 3.5–5.1)
PRO-BNP: 505 PG/ML (ref 0–124)
PROTHROMBIN TIME: 32.8 SEC (ref 11.7–14.5)
RBC # BLD: 5.52 M/UL (ref 4.2–5.9)
SEDIMENTATION RATE, ERYTHROCYTE: 36 MM/HR (ref 0–20)
SODIUM BLD-SCNC: 133 MMOL/L (ref 136–145)
TOTAL CK: 98 U/L (ref 39–308)
TOTAL PROTEIN: 7.3 G/DL (ref 6.4–8.2)
WBC # BLD: 6.9 K/UL (ref 4–11)

## 2022-09-18 PROCEDURE — 6370000000 HC RX 637 (ALT 250 FOR IP): Performed by: EMERGENCY MEDICINE

## 2022-09-18 PROCEDURE — 86140 C-REACTIVE PROTEIN: CPT

## 2022-09-18 PROCEDURE — 82550 ASSAY OF CK (CPK): CPT

## 2022-09-18 PROCEDURE — 85025 COMPLETE CBC W/AUTO DIFF WBC: CPT

## 2022-09-18 PROCEDURE — 85379 FIBRIN DEGRADATION QUANT: CPT

## 2022-09-18 PROCEDURE — 85610 PROTHROMBIN TIME: CPT

## 2022-09-18 PROCEDURE — 80053 COMPREHEN METABOLIC PANEL: CPT

## 2022-09-18 PROCEDURE — 83880 ASSAY OF NATRIURETIC PEPTIDE: CPT

## 2022-09-18 PROCEDURE — 73590 X-RAY EXAM OF LOWER LEG: CPT

## 2022-09-18 PROCEDURE — 85652 RBC SED RATE AUTOMATED: CPT

## 2022-09-18 PROCEDURE — 99284 EMERGENCY DEPT VISIT MOD MDM: CPT

## 2022-09-18 PROCEDURE — 83605 ASSAY OF LACTIC ACID: CPT

## 2022-09-18 RX ORDER — CEPHALEXIN 250 MG/1
500 CAPSULE ORAL ONCE
Status: COMPLETED | OUTPATIENT
Start: 2022-09-18 | End: 2022-09-18

## 2022-09-18 RX ORDER — CEPHALEXIN 500 MG/1
500 CAPSULE ORAL 4 TIMES DAILY
Qty: 28 CAPSULE | Refills: 0 | Status: SHIPPED | OUTPATIENT
Start: 2022-09-18 | End: 2022-09-25

## 2022-09-18 RX ORDER — DOXYCYCLINE 100 MG/1
100 TABLET ORAL 2 TIMES DAILY
Qty: 20 TABLET | Refills: 0 | Status: SHIPPED | OUTPATIENT
Start: 2022-09-18 | End: 2022-09-28

## 2022-09-18 RX ORDER — DOXYCYCLINE HYCLATE 100 MG
100 TABLET ORAL ONCE
Status: COMPLETED | OUTPATIENT
Start: 2022-09-18 | End: 2022-09-18

## 2022-09-18 RX ADMIN — DOXYCYCLINE HYCLATE 100 MG: 100 TABLET, COATED ORAL at 12:04

## 2022-09-18 RX ADMIN — CEPHALEXIN 500 MG: 250 CAPSULE ORAL at 12:04

## 2022-09-18 ASSESSMENT — ENCOUNTER SYMPTOMS
ABDOMINAL PAIN: 0
CHEST TIGHTNESS: 0
SHORTNESS OF BREATH: 0
VOMITING: 0
COLOR CHANGE: 1

## 2022-09-18 ASSESSMENT — PAIN SCALES - GENERAL
PAINLEVEL_OUTOF10: 4
PAINLEVEL_OUTOF10: 6

## 2022-09-18 ASSESSMENT — PAIN - FUNCTIONAL ASSESSMENT
PAIN_FUNCTIONAL_ASSESSMENT: 0-10
PAIN_FUNCTIONAL_ASSESSMENT: 0-10

## 2022-09-18 NOTE — DISCHARGE INSTRUCTIONS
Take doxycycline and Keflex as prescribed due to concern for cellulitis. Have an ultrasound of your right leg tomorrow just to ensure no concern for breakthrough blood clot. Take Tylenol as needed for pain. Return to the emergency department over the next 12 to 24 hours for any worsening leg pain with inability to walk, change in temperature of your foot, new numbness or weakness in the foot, fever, new onset chest pain with shortness of breath, or any other concerns. Otherwise, follow up with your primary doctor in the next 2 to 3 days for repeat evaluation to ensure your leg is improving.

## 2022-09-18 NOTE — Clinical Note
Chio George was seen and treated in our emergency department on 9/18/2022. He may return to work on 09/20/2022. If you have any questions or concerns, please don't hesitate to call.       Ashley Apodaca MD

## 2022-09-18 NOTE — ED PROVIDER NOTES
chest pain. Gastrointestinal:  Negative for abdominal pain and vomiting. Genitourinary:  Negative for difficulty urinating, dysuria and flank pain. Musculoskeletal:  Positive for gait problem (reports slight limp due to right calf pain). Negative for neck pain. Skin:  Positive for color change (right lower leg). Negative for wound. Neurological:  Positive for numbness (both feet is chronic, denies any new changes). Negative for weakness. Hematological:  Bruises/bleeds easily (on Coumadin). Psychiatric/Behavioral:  The patient is not nervous/anxious. Positives and Pertinent negatives as per HPI. PASTMEDICAL HISTORY     Past Medical History:   Diagnosis Date    Asthma     allergy induced    Atrial fibrillation (HCC)     Diabetes mellitus ([facility] [address]     Hypertension     Panic     Pneumonia          SURGICAL HISTORY     History reviewed. No pertinent surgical history.       CURRENT MEDICATIONS       Discharge Medication List as of 9/18/2022 12:07 PM        CONTINUE these medications which have NOT CHANGED    Details   dapagliflozin (FARXIGA) 10 MG tablet TAKE 1 TABLET BY MOUTH DAILYHistorical Med      SYMBICORT 160-4.5 MCG/ACT AERO INHALE 2 PUFFS BY MOUTH TWICE DAILY, DAWHistorical Med      LORazepam (ATIVAN) 1 MG tablet Historical Med      cetirizine (ZYRTEC) 10 MG tablet Take 10 mg by mouth dailyHistorical Med      diclofenac sodium (VOLTAREN) 1 % GEL Apply 4 g topically 4 times daily, Topical, 4 TIMES DAILY Starting Wed 8/25/2021, Historical Med      metFORMIN (GLUCOPHAGE-XR) 750 MG extended release tablet Take 750 mg by mouth 2 times dailyHistorical Med      montelukast (SINGULAIR) 10 MG tablet Take 10 mg by mouth dailyHistorical Med      tiZANidine (ZANAFLEX) 4 MG tablet Take 4 mg by mouth as neededHistorical Med      atorvastatin (LIPITOR) 40 MG tablet Take 40 mg by mouth dailyHistorical Med      lisinopril (PRINIVIL;ZESTRIL) 40 MG tablet Take 40 mg by mouth dailyHistorical Med spironolactone (ALDACTONE) 25 MG tablet TAKE 1 TABLET BY MOUTH DAILY, Disp-30 tablet, R-5Normal      albuterol sulfate HFA (VENTOLIN HFA) 108 (90 BASE) MCG/ACT inhaler Inhale 2 puffs into the lungs every 6 hours as needed for Wheezing or Shortness of Breath, Disp-1 Inhaler, R-6Normal      furosemide (LASIX) 40 MG tablet Take 1 tablet by mouth 2 times daily, Disp-60 tablet, R-0      metoprolol tartrate (LOPRESSOR) 50 MG tablet Take 50 mg by mouth 2 times daily      oxyCODONE-acetaminophen (PERCOCET) 5-325 MG per tablet Take 1 tablet by mouth every 8 hours as needed. Historical Med      diltiazem (DILACOR XR) 240 MG XR capsule Take 240 mg by mouth daily. cloNIDine (CATAPRES) 0.1 MG tablet Take 0.1 mg by mouth 2 times daily. warfarin (COUMADIN) 5 MG tablet   Take 5 mg by mouth daily 10mg Q Tues-Sunday and 7.5mg Q Monday             ALLERGIES     Fluticasone-salmeterol and Flecainide    FAMILY HISTORY       Family History   Problem Relation Age of Onset    Heart Disease Mother     High Blood Pressure Mother     Cancer Father           SOCIAL HISTORY       Social History     Socioeconomic History    Marital status: Single     Spouse name: None    Number of children: None    Years of education: None    Highest education level: None   Tobacco Use    Smoking status: Every Day     Packs/day: 0.25     Years: 40.00     Pack years: 10.00     Types: Cigarettes    Smokeless tobacco: Never    Tobacco comments:     pack every 3 to 4 week   Vaping Use    Vaping Use: Never used   Substance and Sexual Activity    Alcohol use:  Yes     Alcohol/week: 4.0 standard drinks     Types: 4 Standard drinks or equivalent per week    Drug use: No       SCREENINGS    Anabel Coma Scale  Eye Opening: Spontaneous  Best Verbal Response: Oriented  Best Motor Response: Obeys commands  [place] Coma Scale Score: 15           PHYSICAL EXAM    (up to 7 for level 4, 8 or more for level 5)     ED Triage Vitals [09/18/22 1044]   BP Temp Temp src Pulse Resp SpO2 Height Weight   -- -- -- -- -- -- 6' 4\" (1.93 m) (!) 305 lb (138.3 kg)       Physical Exam  Vitals and nursing note reviewed. Constitutional:       General: He is not in acute distress. Appearance: Normal appearance. He is well-developed. He is not ill-appearing, toxic-appearing or diaphoretic. Interventions: He is not intubated. HENT:      Head: Normocephalic and atraumatic. Right Ear: External ear normal.      Left Ear: External ear normal.      Nose: Nose normal.      Mouth/Throat:      Mouth: Mucous membranes are moist.   Eyes:      General:         Right eye: No discharge. Left eye: No discharge. Neck:      Trachea: Trachea and phonation normal. No tracheal deviation. Cardiovascular:      Rate and Rhythm: Normal rate and regular rhythm. Pulses: Normal pulses. Dorsalis pedis pulses are 2+ on the right side and 2+ on the left side. Heart sounds: No friction rub. No gallop. Pulmonary:      Effort: Pulmonary effort is normal. No tachypnea, bradypnea, accessory muscle usage, prolonged expiration, respiratory distress or retractions. He is not intubated. Breath sounds: Normal breath sounds and air entry. No stridor. No decreased breath sounds, wheezing, rhonchi or rales. Chest:      Chest wall: No tenderness. Abdominal:      General: Bowel sounds are normal. There is no distension. Palpations: Abdomen is soft. Tenderness: There is no abdominal tenderness. There is no guarding or rebound. Musculoskeletal:         General: Swelling present. No deformity or signs of injury. Normal range of motion. Cervical back: Full passive range of motion without pain, normal range of motion and neck supple. No edema, erythema, signs of trauma, rigidity, torticollis or crepitus. No pain with movement, spinous process tenderness or muscular tenderness. Normal range of motion.       Right lower leg: Swelling and tenderness (mild to anterior and posterior right lower leg between knee and ankle) present. No deformity, lacerations or bony tenderness. 2+ Pitting Edema present. Left lower leg: No deformity, lacerations, tenderness or bony tenderness. 2+ Pitting Edema present. Right ankle: No lacerations. No tenderness. Normal range of motion. Left ankle: No lacerations. No tenderness. Normal range of motion. Right foot: Normal range of motion. Swelling present. No prominent metatarsal heads, laceration, tenderness, bony tenderness or crepitus. Normal pulse. Left foot: Normal range of motion. Swelling present. No prominent metatarsal heads, laceration, tenderness, bony tenderness or crepitus. Normal pulse. Comments: Normal ankle dorsiflexion and plantar flexion along with great toe dorsiflexion/plantarflexion bilaterally with strength 5 out of 5  Normal movement of both knees and nontender to posterior right knee or posterior right thigh    Skin:     General: Skin is warm and dry. Capillary Refill: Capillary refill takes 2 to 3 seconds. Both feet      Coloration: Skin is not ashen, cyanotic, jaundiced or pale. Findings: Erythema (right calf and anterior tibial region, not involving foot or extending beyond knee at this time, no skin color changes to left leg) present. No abrasion, abscess, bruising, ecchymosis, signs of injury, laceration, rash or wound. Neurological:      General: No focal deficit present. Mental Status: He is alert and oriented to person, place, and time. Mental status is at baseline. GCS: GCS eye subscore is 4. GCS verbal subscore is 5. GCS motor subscore is 6. Sensory: Sensory deficit (tingling to both feet per patient, normal sensation to both legs above knees - reports chronic tingling to both feet) present. Motor: Motor function is intact. No weakness, tremor, atrophy, abnormal muscle tone or seizure activity.    Psychiatric:         Attention and Perception: Attention normal.         Mood and Affect: Mood and affect normal. Mood is not anxious. Speech: Speech normal. Speech is not delayed or slurred. Behavior: Behavior normal. Behavior is cooperative. DIAGNOSTIC RESULTS   :    Labs Reviewed   CBC WITH AUTO DIFFERENTIAL - Abnormal; Notable for the following components:       Result Value    Hematocrit 52.6 (*)     All other components within normal limits   COMPREHENSIVE METABOLIC PANEL - Abnormal; Notable for the following components:    Sodium 133 (*)     Chloride 94 (*)     Glucose 137 (*)     Creatinine 0.7 (*)     All other components within normal limits   PROTIME-INR - Abnormal; Notable for the following components:    Protime 32.8 (*)     INR 3.21 (*)     All other components within normal limits   SEDIMENTATION RATE - Abnormal; Notable for the following components:    Sed Rate 36 (*)     All other components within normal limits   C-REACTIVE PROTEIN - Abnormal; Notable for the following components:    CRP 14.2 (*)     All other components within normal limits   BRAIN NATRIURETIC PEPTIDE - Abnormal; Notable for the following components:    Pro- (*)     All other components within normal limits   LACTIC ACID   D-DIMER, QUANTITATIVE   CK       All other labs were within normal range or not returned asof this dictation. EKG: All EKG's are interpreted by the Emergency Department Physician who either signs or Co-signs this chart in the absence of a cardiologist.        RADIOLOGY:   Non-plain film images such as CT, Ultrasound and MRI are read by the radiologist. [place] images are visualized and preliminarily interpreted by the  ED Provider with the belowfindings:        Interpretation per the Radiologist below, if available at the time of this note:    XR TIBIA FIBULA RIGHT (2 VIEWS)   Final Result   Cellulitis or venous stasis edema at the subcu region.          VL Extremity Venous Right    (Results Pending)         PROCEDURES Unless otherwise noted below, none     Procedures    CRITICAL CARE TIME   N/A    CONSULTS:  None    EMERGENCY DEPARTMENT COURSE and DIFFERENTIAL DIAGNOSIS/MDM:   Vitals:    Vitals:    09/18/22 1044 09/18/22 1052 09/18/22 1206   BP:  134/84 128/76   Pulse:  95 82   Resp:  16 16   Temp:  98.1 °F (36.7 °C)    TempSrc:  Oral    SpO2:  96% 95%   Weight: (!) 305 lb (138.3 kg)     Height: 6' 4\" (1.93 m)         Patient was given the following medications:  Medications   doxycycline hyclate (VIBRA-TABS) tablet 100 mg (100 mg Oral Given 9/18/22 1204)   cephALEXin (KEFLEX) capsule 500 mg (500 mg Oral Given 9/18/22 1204)       Patient was evaluated due to having right lower leg pain worsening over the past 4 days with some more swelling to the right leg compared to the left per patient. I am considering cellulitis, DVT, septic arthritis, muscle cramp, electrolyte abnormality, amongst other pathology. He is on Coumadin although we will check an INR level to determine if he is therapeutic or not. He did have some erythema to the right leg compared to the left and therefore I do wonder if this is cellulitis. He had normal range of motion of the right knee and right ankle and therefore no concern for septic arthritis at this time. Pain was not out of proportion and story not suggestive of compartment syndrome or necrotizing fasciitis. He is reportedly on pain management and does have Percocet at home for pain as needed. He is still able to walk per patient but he does have some discomfort with this. At this point, since he is therapeutic on his Coumadin, I did recommend still getting an ultrasound tomorrow but no need for any additional anticoagulation at this point. I was more concerned about cellulitis at this time anyways based on exam since his erythema was involving the anterior and posterior tibia. No concern for compartment syndrome at this time.   I did recommend taking doxycycline and Keflex to see if this helps with the infection. At this point, he felt comfortable going home and trying outpatient antibiotics to see if this helps. He is aware that if pain worsens over the next 6 to 24 hours with significant trouble walking, change in temperature of the right foot or new weakness, new onset chest pain or shortness of breath, or any other concerns related to his right leg pain, then return immediately to the ED for repeat assessment and possible admission, but otherwise make sure to follow-up with his primary doctor within 2 to 3 days for repeat skin exam.  He was well-appearing and in no acute distress at time of discharge and felt comfortable with this plan. He plans to get his ultrasound of his right lower leg done tomorrow and that is what I recommended to him    I was the primary provider for the patient. The patient tolerated their visit well. The patient and / or the family were informed of the results of any tests, a time was given to answer questions. FINAL IMPRESSION      1. Cellulitis of right leg    2. Right leg swelling          DISPOSITION/PLAN   DISPOSITION Decision To Discharge 09/18/2022 11:50:44 AM-       PATIENT REFERRED TO:  [facility]  ED  [address]  Go to   If symptoms worsen    Cassandra Clock III, [address] [place] [address]  [phone]    Schedule an appointment as soon as possible for a visit   For repeat leg exam to ensure improving    DISCHARGEMEDICATIONS:  Discharge Medication List as of 9/18/2022 12:07 PM        START taking these medications    Details   cephALEXin (KEFLEX) 500 MG capsule Take 1 capsule by mouth 4 times daily for 7 days, Disp-28 capsule, R-0Print      doxycycline monohydrate (ADOXA) 100 MG tablet Take 1 tablet by mouth 2 times daily for 10 days May substitute another form of Doxycycline if insurance requires. , Disp-20 tablet, R-0Print             DISCONTINUED MEDICATIONS:  Discharge Medication List as of 9/18/2022 12:07 PM                 (Please note that portions of this note were completed with a voicerecognition program.  Efforts were made to edit the dictations but occasionally words are mis-transcribed.)    Yomaira Ybarra MD (electronically signed)            Yomaira Ybarra MD  09/18/22 [address] Childers MD  09/18/22 [phone]

## 2022-09-19 ENCOUNTER — HOSPITAL ENCOUNTER (OUTPATIENT)
Dept: VASCULAR LAB | Age: 62
Discharge: HOME | End: 2022-09-19
Payer: MEDICARE

## 2022-09-19 DIAGNOSIS — M79.89 RIGHT LEG SWELLING: ICD-10-CM

## 2022-09-19 PROCEDURE — 93971 EXTREMITY STUDY: CPT

## 2023-02-16 NOTE — PROGRESS NOTES
[facility]   Advanced Heart Failure/Pulmonary Hypertension  Cardiac Evaluation      Paula Lennox  YOB: 1960    Date of Visit:  2/21/23    Chief Complaint   Patient presents with    Follow-up          History of Present Illness:  Paula Lennox is a 60 y.o. male who presents for follow up for CHF. He was admitted from 12/26/16-01/01/17 to [facility] with c/o sob and CHA on going since October this year. Pt was seen by PCP who treated him for pneumonia. 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 He is having to sit up most of the night as a result of persistent nocturnal coughing fits. He report snoring and problem having a sufficient sleep at night, PCP has been talking to him about getting a sleep study done. Pt is an active smoker, uses inhaler at home but unaware of any formal diagnosis of COPD. He is morbidly obese with chronic venous edema/stasis of BL lower ext which has not changed much from baseline. Pt was found to be hypoxic in the mid 80s on RA which is new. CTA chest was negative for PE but showed bilateral areas of opacities, WBC was normal. Pt has baseline hx of HTN, Afib and obesity. He says that his swelling in legs is better but has significant abdominal swelling. He is on coumadin which is managed by his PCP. Echo done on 5/11/18 shows EF 55% (see report below). On 06/16/20 his weight was up 20 lbs since 09/2019. He reports poor diet choices during the COVID 19 pandemic shut down. His echo from 12/08/20 showed RV dilated and normal EF of 50%. On 12/08/20 he reported he was unable to use the CPAP. At [address] 8/31/2021 he reported his weight was down 11 lbs with better diet choices. OV, 4/26/2022, He reports his PCP added Farxiga to assist with his diabetes management. Today, 2/21/2023, he says he is doing well. He is still losing weight.  He says his PCP is referring him to a hematologist for elevated hemoglobin. He is still smoking half pack per day. He does not use his CPAP he says he did not like it. Patient is taking all cardiac medications as prescribed and tolerates them well. Patient denies current edema, chest pain, sob, palpitations, dizziness or syncope. Allergies   Allergen Reactions    Fluticasone-Salmeterol Shortness Of Breath     Afib per pt. Flecainide      Rash and swelling     Current Outpatient Medications   Medication Sig Dispense Refill    oxyCODONE-acetaminophen (PERCOCET) 7.5-325 MG per tablet TAKE 1 TABLET BY MOUTH EVERY 8 HOURS AS NEEDED      dapagliflozin (FARXIGA) 10 MG tablet TAKE 1 TABLET BY MOUTH DAILY      SYMBICORT 160-4.5 MCG/ACT AERO INHALE 2 PUFFS BY MOUTH TWICE DAILY      cetirizine (ZYRTEC) 10 MG tablet Take 10 mg by mouth daily      diclofenac sodium (VOLTAREN) 1 % GEL Apply 4 g topically 4 times daily      metFORMIN (GLUCOPHAGE-XR) 750 MG extended release tablet Take 750 mg by mouth 2 times daily      montelukast (SINGULAIR) 10 MG tablet Take 10 mg by mouth daily      atorvastatin (LIPITOR) 40 MG tablet Take 40 mg by mouth daily      lisinopril (PRINIVIL;ZESTRIL) 40 MG tablet Take 40 mg by mouth daily      spironolactone (ALDACTONE) 25 MG tablet TAKE 1 TABLET BY MOUTH DAILY 30 tablet 5    albuterol sulfate HFA (VENTOLIN HFA) 108 (90 BASE) MCG/ACT inhaler Inhale 2 puffs into the lungs every 6 hours as needed for Wheezing or Shortness of Breath 1 Inhaler 6    furosemide (LASIX) 40 MG tablet Take 1 tablet by mouth 2 times daily 60 tablet 0    metoprolol tartrate (LOPRESSOR) 50 MG tablet Take 50 mg by mouth 2 times daily      diltiazem (DILACOR XR) 240 MG XR capsule Take 240 mg by mouth daily. cloNIDine (CATAPRES) 0.1 MG tablet Take 0.1 mg by mouth 2 times daily.       warfarin (COUMADIN) 5 MG tablet   Take 5 mg by mouth daily 10mg Q Tues-Sunday and 7.5mg Q Monday      tiZANidine (ZANAFLEX) 4 MG tablet Take 4 mg by mouth as needed (Patient not taking: Reported on 2/21/2023)      oxyCODONE-acetaminophen (PERCOCET) 5-325 MG per tablet Take 1 tablet by mouth every 8 hours as needed. (Patient not taking: Reported on 2/21/2023)       No current facility-administered medications for this visit. Past Medical History:   Diagnosis Date    Asthma     allergy induced    Atrial fibrillation ([facility] [address]     Di[facility]     Hypertension     Panic     Pneumonia      History reviewed. No pertinent surgical history. Family History   Problem Relation Age of Onset    Heart Disease Mother     High Blood Pressure Mother     Cancer Father      Social History     Socioeconomic History    Marital status: Single     Spouse name: Not on file    Number of children: Not on file    Years of education: Not on file    Highest education level: Not on file   Occupational History    Not on file   Tobacco Use    Smoking status: Every Day     Packs/day: 0.25     Years: 40.00     Pack years: 10.00     Types: Cigarettes    Smokeless tobacco: Never    Tobacco comments:     pack every 3 to 4 week   Vaping Use    Vaping Use: Never used   Substance and Sexual Activity    Alcohol use: Yes     Alcohol/week: 4.0 standard drinks     Types: 4 Standard drinks or equivalent per week    Drug use: No    Sexual activity: Not on file   Other Topics Concern    Not on file   Social History Narrative    Not on file     Social Determinants of Health     Financial Resource Strain: Not on file   Food Insecurity: Not on file   Transportation Needs: Not on file   Physical Activity: Not on file   Stress: Not on file   Social Connections: Not on file   Intimate Partner Violence: Not on file   Housing Stability: Not on file       Review of Systems:   Constitutional: there has been no unanticipated weight loss. There's been no change in energy level, sleep pattern, or activity level. Eyes: No visual changes or diplopia. No scleral icterus.   ENT: No Headaches, hearing loss or vertigo. No mouth sores or sore throat. Cardiovascular: Reviewed in HPI  Respiratory: No cough or wheezing, no sputum production. No hematemesis. Gastrointestinal: No abdominal pain, appetite loss, blood in stools. No change in bowel or bladder habits. Genitourinary: No dysuria, trouble voiding, or hematuria. Musculoskeletal:  No gait disturbance, weakness or joint complaints. Integumentary: No rash or pruritis. Neurological: No headache, diplopia, change in muscle strength, numbness or tingling. No change in gait, balance, coordination, mood, affect, memory, mentation, behavior. Psychiatric: No anxiety, no depression. Endocrine: No malaise, fatigue or temperature intolerance. No excessive thirst, fluid intake, or urination. No tremor. Hematologic/Lymphatic: No abnormal bruising or bleeding, blood clots or swollen lymph nodes. Allergic/Immunologic: No nasal congestion or hives. Physical Examination:    Vitals:    02/21/23 1122   BP: 102/60   Pulse: 94   SpO2: 94%   Weight: 294 lb (133.4 kg)   Height: 6' 4\" (1.93 m)       Body mass index is 35.79 kg/m². Wt Readings from Last 3 Encounters:   02/21/23 294 lb (133.4 kg)   09/18/22 (!) 305 lb (138.3 kg)   04/26/22 (!) 310 lb (140.6 kg)     BP Readings from Last 3 Encounters:   02/21/23 102/60   09/18/22 128/76   04/26/22 108/64         Constitutional and General Appearance:   WD/WN in NAD  HEENT:  NC/AT  Skin:  Warm, dry  Respiratory:  Normal excursion and expansion without use of accessory muscles  Resp Auscultation: Normal breath sounds without dullness  Cardiovascular: The apical impulses not displaced  Heart tones are crisp and normal  Cervical veins are not engorged  The carotid upstroke is normal in amplitude and contour without delay or bruit  JVP 9-10 cm H2O  Irregularly irregular rhythm with nl S1 and S2 without m,r,g  Peripheral pulses are symmetrical and full  There is no clubbing, cyanosis of the extremities.   1+ bilateral edema below the knees  Femoral Arteries: 2+ and equal  Pedal Pulses: 2+ and equal   Neck:  JVP minimally elevated  No thyromegaly  Abdomen:  No masses or tenderness  Liver/Spleen: No Abnormalities Noted  Neurological/Psychiatric:  Alert and oriented in all spheres  Moves all extremities well  Exhibits normal gait balance and coordination  No abnormalities of mood, affect, memory, mentation, or behavior are noted    Echo: 05/12/18   Normal left ventricle systolic function with an estimated ejection fraction   of 55%. No regional wall motion abnormalities are seen. Elevated left ventricular diastolic filling pressure. The left atrium is mildly dilated. The right atrium and ventricle are moderately dilated. Mild mitral and tricuspid regurgitation. Systolic pulmonary artery pressure (SPAP) estimated at 41 mmHg (RA pressure   8 mmHg), consistent with mild pulmonary hypertension. Atrial fibrillation throughout the study. Echo: 12/08/20   Technically difficult examination. Low normal systolic function with an estimated ejection fraction of 50%. Global left ventricular hypokinesis. Definity contrast administered with no evidence of left ventricular mass or   thrombus noted. Elevated left ventricular diastolic filling pressure. Mild bi-atrial enlargement. The right ventricle is appears dilated with normal function. Systolic pulmonary artery pressure (SPAP) is normal and estimated at 35mmHg   (right atrial pressure 8 mmHg). Atrial fibrillation throughout the study. Labs were reviewed including labs from other hospital systems through [facility]. Cardiac testing was reviewed including echos, nuclear scans, cardiac catheterization, including from other hospital systems through [facility]. Assessment:    1. Chronic diastolic heart failure ([address]    2. Essential hypertension    3. Permanent atrial fibrillation ([address]    4. Mixed hyperlipidemia           Plan:  1.  Continue current medications  2. Discussed the importance of quitting smoking for improved overall health 1-800-[email or web address]  3. Discussed the importance of healthy sleep and using CPAP   4. Echocardiogram at next visit to assess heart function    Call [phone] to schedule  5. Follow up in 6 months with echo    This note was scribed in the presence of Riley Escalante MD by Salvador Leal RN    The scribe's documentation has been prepared under my direction and personally reviewed by me in its entirety. I confirm that the note above accurately reflects all work, treatment, procedures, and medical decision making performed by me. Time Based Itemization  A total of 40 minutes was spent on today's patient encounter. If applicable, non-patient-facing activities:  ( x)Preparing to see the patient and reviewing records  ( ) Individual interpretation of results  ( ) Discussion or coordination of care with other health care professionals  ( x) Ordering of unique tests, medications, or procedures  ( x) Documentation within the EHR     I appreciate the opportunity of cooperating in the care of this patient.     Topher Buck M.D., [facility]

## 2023-02-21 ENCOUNTER — OFFICE VISIT (OUTPATIENT)
Dept: CARDIOLOGY CLINIC | Age: 63
End: 2023-02-21
Payer: MEDICARE

## 2023-02-21 VITALS
DIASTOLIC BLOOD PRESSURE: 60 MMHG | WEIGHT: 294 LBS | OXYGEN SATURATION: 94 % | HEIGHT: 76 IN | SYSTOLIC BLOOD PRESSURE: 102 MMHG | HEART RATE: 94 BPM | BODY MASS INDEX: 35.8 KG/M2

## 2023-02-21 DIAGNOSIS — I50.32 CHRONIC DIASTOLIC HEART FAILURE (HCC): Primary | ICD-10-CM

## 2023-02-21 DIAGNOSIS — E78.2 MIXED HYPERLIPIDEMIA: ICD-10-CM

## 2023-02-21 DIAGNOSIS — I10 ESSENTIAL HYPERTENSION: ICD-10-CM

## 2023-02-21 DIAGNOSIS — I48.21 PERMANENT ATRIAL FIBRILLATION (HCC): ICD-10-CM

## 2023-02-21 PROCEDURE — 99215 OFFICE O/P EST HI 40 MIN: CPT | Performed by: INTERNAL MEDICINE

## 2023-02-21 PROCEDURE — 3078F DIAST BP <80 MM HG: CPT | Performed by: INTERNAL MEDICINE

## 2023-02-21 PROCEDURE — 3074F SYST BP LT 130 MM HG: CPT | Performed by: INTERNAL MEDICINE

## 2023-02-21 RX ORDER — OXYCODONE AND ACETAMINOPHEN 7.5; 325 MG/1; MG/1
TABLET ORAL
COMMUNITY
Start: 2023-02-07

## 2023-02-21 RX ORDER — CLONIDINE HYDROCHLORIDE 0.2 MG/1
TABLET ORAL
COMMUNITY
Start: 2023-01-30 | End: 2023-02-21

## 2023-02-21 NOTE — PATIENT INSTRUCTIONS
Your provider has ordered testing for further evaluation. An order/prescription has been included in your paper work. To schedule outpatient testing, contact Central Scheduling by calling [address] [place] [address] ([phone]). Plan:  1. Continue current medications  2. Discussed the importance of quitting smoking for improved overall health 1-800-[email or web address]-NOW  3. Discussed the importance of healthy sleep and using CPAP   4. Echocardiogram at next visit to assess heart function    Call [phone] to schedule  5.  Follow up in 6 months with echo

## 2023-03-28 ENCOUNTER — ANTI-COAG VISIT (OUTPATIENT)
Dept: PHARMACY | Age: 63
End: 2023-03-28
Payer: MEDICARE

## 2023-03-28 LAB — INR BLD: 1.4

## 2023-03-28 PROCEDURE — 85610 PROTHROMBIN TIME: CPT | Performed by: FAMILY MEDICINE

## 2023-03-28 PROCEDURE — 99211 OFF/OP EST MAY X REQ PHY/QHP: CPT | Performed by: FAMILY MEDICINE

## 2023-03-28 NOTE — PROGRESS NOTES
Mr. Sohan Alvarez is here for management of anticoagulation for Afib. Pt started on Coumadin in 2013. He presents today w/out complaint. Pt verified dosing regimen. PMH significant DMII, HTN, and chronic back pain. Pt denies s/s bleeding/bruising/swelling/SOB. Pt states he did not miss any doses. No changes in Rx/OTCs/Herbal medications. Pt takes his warfarin in the AM.   Pt has occasional EtOH use; occasional smoker. Hx of noncompliance with appts. Patient was recently in the hospital where his INR was 4. States he was told by hospitalist to hold doses on Saturday, Sunday, and Monday until his next INR appointment. Patient also states he's been started on doxycycline. Has not been seen in clinic since August 2022. Patient is confused about his warfarin dosing. We will have him take 10 mg daily until next INR check. On doxycycline as well     INR 1.4 is below the acceptable therapeutic range of 2-3. Recommend to continue 10 mg daily (patient is unsure of what dose he was taking in the past, likely was only taking 7.5 mg daily)  Patient has 10 mg tablets  Will continue to monitor and check INR in 1 week  Dosing reminder card given with phone number, appointment date and time.    Return to clinic: 4/5 @ [phone]  Referring physician: Severiano Monzon, PharmD 3/28/2023 11:01 AM

## 2023-04-05 ENCOUNTER — ANTI-COAG VISIT (OUTPATIENT)
Dept: PHARMACY | Age: 63
End: 2023-04-05
Payer: MEDICARE

## 2023-04-05 LAB — INR BLD: 3.9

## 2023-04-05 PROCEDURE — 99211 OFF/OP EST MAY X REQ PHY/QHP: CPT

## 2023-04-05 PROCEDURE — 85610 PROTHROMBIN TIME: CPT

## 2023-04-05 NOTE — PROGRESS NOTES
Mr. Talia Mao is here for management of anticoagulation for Afib. Pt started on Coumadin in 2013. He presents today w/out complaint. Pt verified dosing regimen. PMH significant DMII, HTN, and chronic back pain. Pt denies s/s bleeding/bruising/swelling/SOB. Pt states he did not miss any doses. No changes in Rx/OTCs/Herbal medications. Pt takes his warfarin in the AM.   Pt has occasional EtOH use; occasional smoker. Hx of noncompliance with appts. Patient was recently in the hospital where his INR was 4. States he was told by hospitalist to hold doses on Saturday, Sunday, and Monday until his next INR appointment. Patient also states he's been started on doxycycline. Had not been seen in clinic since August 2022. INR 3.9 is above the acceptable therapeutic range of 2-3. Recommend to hold today then dec to  continue 10 mg daily, except 5mg Q Fri. Patient has 10 mg tablets and 7.5 tabs   Will continue to monitor and check INR in 1 week  Dosing reminder card given with phone number, appointment date and time.    Return to clinic: 4/14 @ [phone]  Referring physician: [id number] [facility] Only    Intervention Detail: Dose Adjustment: 1, reason: Therapy Optimization  Total # of Interventions Recommended: 1  Total # of Interventions Accepted: 1  Time Spent (min): 15

## 2023-04-21 ENCOUNTER — ANTI-COAG VISIT (OUTPATIENT)
Dept: PHARMACY | Age: 63
End: 2023-04-21
Payer: MEDICARE

## 2023-04-21 DIAGNOSIS — I48.21 PERMANENT ATRIAL FIBRILLATION (HCC): Primary | ICD-10-CM

## 2023-04-21 LAB — INR BLD: 5.9

## 2023-04-21 PROCEDURE — 99211 OFF/OP EST MAY X REQ PHY/QHP: CPT

## 2023-04-21 PROCEDURE — 85610 PROTHROMBIN TIME: CPT

## 2023-04-21 NOTE — PROGRESS NOTES
Mr. Davila Speaker is here for management of anticoagulation for Afib. Pt started on Coumadin in 2013. He presents today w/out complaint. Pt verified dosing regimen. PMH significant DMII, HTN, and chronic back pain. Pt denies s/s bleeding/bruising/swelling/SOB. Pt states he did not miss any doses. No changes in Rx/OTCs/Herbal medications. Pt takes his warfarin in the AM.   Pt has occasional EtOH use; occasional smoker. Hx of noncompliance with appts. Pt states he has lost about 40 lbs. INR 5.9 is above the acceptable therapeutic range of 2-3, despite hold and dose dec. Recommend to hold today and tomorrow then 10 mg Sun. Patient has 10 mg tablets and 7.5 tabs   Will continue to monitor and check INR in 3 days. Dosing reminder card given with phone number, appointment date and time.    Return to clinic: 4/24 @ [phone]  Referring physician: [id number] [facility] Only    Intervention Detail: Dose Adjustment: 1, reason: Therapy De-escalation  Total # of Interventions Recommended: 1  Total # of Interventions Accepted: 1  Time Spent (min): 15

## 2023-04-26 ENCOUNTER — ANTI-COAG VISIT (OUTPATIENT)
Dept: PHARMACY | Age: 63
End: 2023-04-26
Payer: MEDICARE

## 2023-04-26 DIAGNOSIS — I48.21 PERMANENT ATRIAL FIBRILLATION (HCC): Primary | ICD-10-CM

## 2023-04-26 LAB — INR BLD: 1.9

## 2023-04-26 PROCEDURE — 99211 OFF/OP EST MAY X REQ PHY/QHP: CPT

## 2023-04-26 PROCEDURE — 85610 PROTHROMBIN TIME: CPT

## 2023-04-26 NOTE — PROGRESS NOTES
Mr. Maikel Lewis is here for management of anticoagulation for Afib. Pt started on Coumadin in 2013. He presents today w/out complaint. Pt verified dosing regimen. PMH significant DMII, HTN, and chronic back pain. Pt denies s/s bleeding/bruising/swelling/SOB. Pt states he did not miss any doses. No changes in Rx/OTCs/Herbal medications. Pt takes his warfarin in the AM.   Pt has occasional EtOH use; occasional smoker. Hx of noncompliance with appts. Pt states he has lost about 40 lbs. Addendum 4/24- pt did not come to appt today, but called today for dosing. Told him to take 5mg Monday and Tuesday, and will be seen on Wednesday. PT accidentally took 10mg on Mon, so he skipped last pm.       INR 1.9 is within acceptable therapeutic range of 2-3, this is after holding for 3 days for an INR 5.9. Recommend to dec his dose to  5mg every M/W/F and 10mg daily all other days. Patient has 10 mg tablets and 7.5 tabs   Will continue to monitor and check INR in 1 week. Dosing reminder card given with phone number, appointment date and time.    Return to clinic: 5/5 @ [phone]  Referring physician: [address] Only    Intervention Detail: Dose Adjustment: 1, reason: Therapy Optimization  Total # of Interventions Recommended: 1  Total # of Interventions Accepted: 1  Time Spent (min): 15

## 2023-05-05 ENCOUNTER — ANTI-COAG VISIT (OUTPATIENT)
Dept: PHARMACY | Age: 63
End: 2023-05-05
Payer: MEDICARE

## 2023-05-05 DIAGNOSIS — I48.21 PERMANENT ATRIAL FIBRILLATION (HCC): Primary | ICD-10-CM

## 2023-05-05 LAB — INR BLD: 3.4

## 2023-05-05 PROCEDURE — 99211 OFF/OP EST MAY X REQ PHY/QHP: CPT

## 2023-05-05 PROCEDURE — 85610 PROTHROMBIN TIME: CPT

## 2023-05-05 NOTE — PROGRESS NOTES
MrJoselin Mendoza is here for management of anticoagulation for Afib. Pt started on Coumadin in 2013. He presents today w/out complaint. Pt verified dosing regimen. PMH significant DMII, HTN, and chronic back pain. Pt denies s/s bleeding/bruising/swelling/SOB. Pt states he did not miss any doses. No changes in Rx/OTCs/Herbal medications. Pt takes his warfarin in the AM.   Pt has occasional EtOH use; occasional smoker. Hx of noncompliance with appts. Pt states he has lost about 40 lbs. INR 3.4 is slightly above acceptable therapeutic range of 2-3. Recommend to dec his dose to 10mg every M/W/F and 5mg daily all other days. Patient has 10 mg tablets and 7.5 tabs   Will continue to monitor and check INR in 10 days. Dosing reminder card given with phone number, appointment date and time.    Return to clinic: 5/17  @ [phone]  Referring physician: [facility] [address] Only    Intervention Detail:   Total # of Interventions Recommended: 0  Total # of Interventions Accepted: 0  Time Spent (min): 15

## 2023-05-17 ENCOUNTER — ANTI-COAG VISIT (OUTPATIENT)
Dept: PHARMACY | Age: 63
End: 2023-05-17
Payer: MEDICARE

## 2023-05-17 DIAGNOSIS — I48.21 PERMANENT ATRIAL FIBRILLATION (HCC): Primary | ICD-10-CM

## 2023-05-17 LAB — INR BLD: 4.7

## 2023-05-17 PROCEDURE — 99211 OFF/OP EST MAY X REQ PHY/QHP: CPT

## 2023-05-17 PROCEDURE — 85610 PROTHROMBIN TIME: CPT

## 2023-05-17 NOTE — PROGRESS NOTES
Mr. Luis Tyson is here for management of anticoagulation for Afib. Pt started on Coumadin in 2013. He presents today w/out complaint. Pt verified dosing regimen. PMH significant DMII, HTN, and chronic back pain. Pt denies s/s bleeding/bruising/swelling/SOB. Pt states he did not miss any doses. No changes in Rx/OTCs/Herbal medications. Pt takes his warfarin in the AM.   Pt has occasional EtOH use; occasional smoker. Hx of noncompliance with appts. Pt states he has lost about 40 lbs. Pt started Cephalexin 500mg 4 x days for 7 days, started MON. INR 4.7 is above acceptable therapeutic range of 2-3. Recommend to hold x 2 days dec his dose to 5mg Fri then resume 10mg every M/W/F and 5mg daily all other days. Will revaluate dosing once pt is off abx. Patient has 10 mg tablets and 7.5 tabs   Will continue to monitor and check INR in 10 days. Dosing reminder card given with phone number, appointment date and time.    Return to clinic: 5/24  @ [phone]  Referring physician: [facility] [address] Only    Intervention Detail: Dose Adjustment: 1, reason: Therapy Optimization  Total # of Interventions Recommended: 1  Total # of Interventions Accepted: 1  Time Spent (min): 15

## 2023-05-24 ENCOUNTER — ANTI-COAG VISIT (OUTPATIENT)
Dept: PHARMACY | Age: 63
End: 2023-05-24
Payer: MEDICARE

## 2023-05-24 DIAGNOSIS — I48.21 PERMANENT ATRIAL FIBRILLATION (HCC): Primary | ICD-10-CM

## 2023-05-24 LAB — INR BLD: 2.1

## 2023-05-24 PROCEDURE — 85610 PROTHROMBIN TIME: CPT

## 2023-05-24 PROCEDURE — 99211 OFF/OP EST MAY X REQ PHY/QHP: CPT

## 2023-05-24 NOTE — PROGRESS NOTES
Mr. Joon Haddad is here for management of anticoagulation for Afib. Pt started on Coumadin in 2013. He presents today w/out complaint. Pt verified dosing regimen. PMH significant DMII, HTN, and chronic back pain. Pt denies s/s bleeding/bruising/swelling/SOB. Pt states he did not miss any doses. No changes in Rx/OTCs/Herbal medications. Pt takes his warfarin in the AM.   Pt has occasional EtOH use; occasional smoker. Hx of noncompliance with appts. Pt states he has lost about 40 lbs. Pt off Cephalexin since Mon. INR 2.1 is within  acceptable therapeutic range of 2-3. Recommend to continue 10 mg every M/W/F and 5mg daily all other days. Patient has 10 mg tablets  Will continue to monitor and check INR in 10 days. Dosing reminder card given with phone number, appointment date and time.    Return to clinic: 6/7  @ [phone]  Referring physician: [id number] [facility] Only    Intervention Detail:   Total # of Interventions Recommended: 0  Total # of Interventions Accepted: 0  Time Spent (min): 15

## 2023-06-07 ENCOUNTER — ANTI-COAG VISIT (OUTPATIENT)
Dept: PHARMACY | Age: 63
End: 2023-06-07
Payer: MEDICAID

## 2023-06-07 DIAGNOSIS — I48.21 PERMANENT ATRIAL FIBRILLATION (HCC): Primary | ICD-10-CM

## 2023-06-07 LAB — INR BLD: 3.3

## 2023-06-07 PROCEDURE — 85610 PROTHROMBIN TIME: CPT

## 2023-06-07 PROCEDURE — 99211 OFF/OP EST MAY X REQ PHY/QHP: CPT

## 2023-06-07 NOTE — PROGRESS NOTES
Mr. Singh Boyer is here for management of anticoagulation for Afib. Pt started on Coumadin in 2013. He presents today w/out complaint. Pt verified dosing regimen. PMH significant DMII, HTN, and chronic back pain. Pt denies s/s bleeding/bruising/swelling/SOB. Pt states he did not miss any doses. No changes in Rx/OTCs/Herbal medications. Pt takes his warfarin in the AM.   Pt has occasional EtOH use; occasional smoker. Pt states he has lost about 40 lbs     No changes per patient. Using topical triamcinolone. INR 3.3 is above acceptable therapeutic range of 2-3. Recommend to decrease to 10 mg every M/F and 5mg daily all other days. Patient has 10 mg tablets  Will continue to monitor and check INR in 2 weeks. Dosing reminder card given with phone number, appointment date and time.    Return to clinic: 6/21  @    Referring physician: Vandana GastelumD candidate    For Pharmacy Admin Tracking Only    Intervention Detail: Dose Adjustment: 1, reason: Therapy De-escalation  Total # of Interventions Recommended: 1  Total # of Interventions Accepted: 1  Time Spent (min): 15

## 2023-06-21 ENCOUNTER — ANTI-COAG VISIT (OUTPATIENT)
Dept: PHARMACY | Age: 63
End: 2023-06-21
Payer: MEDICAID

## 2023-06-21 DIAGNOSIS — I48.21 PERMANENT ATRIAL FIBRILLATION (HCC): Primary | ICD-10-CM

## 2023-06-21 LAB — INR BLD: 2.9

## 2023-06-21 PROCEDURE — 85610 PROTHROMBIN TIME: CPT

## 2023-06-21 PROCEDURE — 99211 OFF/OP EST MAY X REQ PHY/QHP: CPT

## 2023-06-21 NOTE — PROGRESS NOTES
Mr. Munoz Person is here for management of anticoagulation for Afib. Pt started on Coumadin in . He presents today w/out complaint. Pt verified dosing regimen. PMH significant DMII, HTN, and chronic back pain. Pt denies s/s bleeding/bruising/swelling/SOB. Pt states he did not miss any doses. No changes in Rx/OTCs/Herbal medications. Pt takes his warfarin in the AM.   Pt has occasional EtOH use; occasional smoker. Pt states he has lost about 40 lbs     Started Clindamycin yesterday 23 - no direct interaction with warfarin, but GI upset can affect INR. Pt will be on Abx for 10 days total.   Pt is seeing Dr. Luis Carlos Castaneda (hematologist) at [facility] on 23 and will request MD to check the INR at visit. Will check INR in a week to make sure everything is stable. INR 2.9 is within acceptable therapeutic range of 2-3. Recommend to continue reduced dose of 10 mg every M/ and 5mg daily all other days. Patient has 10 mg tablets  Will continue to monitor and check INR in 1 week. Dosing reminder card given with phone number, appointment date and time.    Return to clinic: 23  @ [phone]  Referring physician: Mady GastelumD candidate    For Pharmacy Admin Tracking Only    Intervention Detail: Adherence Monitorin  Total # of Interventions Recommended: 1  Total # of Interventions Accepted: 1  Time Spent (min): 15

## 2023-06-28 ENCOUNTER — ANTI-COAG VISIT (OUTPATIENT)
Dept: PHARMACY | Age: 63
End: 2023-06-28
Payer: MEDICARE

## 2023-06-28 DIAGNOSIS — I48.21 PERMANENT ATRIAL FIBRILLATION (HCC): Primary | ICD-10-CM

## 2023-06-28 LAB — INR BLD: 3

## 2023-06-28 PROCEDURE — 99211 OFF/OP EST MAY X REQ PHY/QHP: CPT

## 2023-06-28 PROCEDURE — 85610 PROTHROMBIN TIME: CPT

## 2023-07-14 ENCOUNTER — ANTI-COAG VISIT (OUTPATIENT)
Dept: PHARMACY | Age: 63
End: 2023-07-14
Payer: MEDICAID

## 2023-07-14 DIAGNOSIS — I48.21 PERMANENT ATRIAL FIBRILLATION (HCC): Primary | ICD-10-CM

## 2023-07-14 LAB — INR BLD: 2.4

## 2023-07-14 PROCEDURE — 99211 OFF/OP EST MAY X REQ PHY/QHP: CPT | Performed by: HOME HEALTH

## 2023-07-14 PROCEDURE — 85610 PROTHROMBIN TIME: CPT | Performed by: HOME HEALTH

## 2023-07-14 NOTE — PROGRESS NOTES
Mr. Jonathan Álvarez is here for management of anticoagulation for Afib. Pt started on Coumadin in . He presents today w/out complaint. Pt verified dosing regimen. PMH significant DMII, HTN, and chronic back pain. Pt denies s/s bleeding/bruising/swelling/SOB. Pt states he did not miss any doses. No changes in Rx/OTCs/Herbal medications. Pt takes his warfarin in the AM.   Pt has occasional EtOH use; occasional smoker. Pt states he has lost about 40 lbs     Pt started Cephalexin on 7/9  x 7 days. INR 2.4 is within acceptable therapeutic range of 2-3. Recommend to continue reduced dose of 10 mg every M/F and 5mg daily all other days. Patient has 10 mg tablets  Will continue to monitor and check INR in 10 days. Dosing reminder card given with phone number, appointment date and time.    Return to clinic: 23  @ [phone]  Referring physician: [address] Only    Intervention Detail: Adherence Monitorin  Total # of Interventions Recommended: 1  Total # of Interventions Accepted: 1  Time Spent (min): 15

## 2023-07-31 ENCOUNTER — ANTI-COAG VISIT (OUTPATIENT)
Dept: PHARMACY | Age: 63
End: 2023-07-31
Payer: MEDICARE

## 2023-07-31 DIAGNOSIS — I48.21 PERMANENT ATRIAL FIBRILLATION (HCC): Primary | ICD-10-CM

## 2023-07-31 LAB — INR BLD: 2.2

## 2023-07-31 PROCEDURE — 99211 OFF/OP EST MAY X REQ PHY/QHP: CPT | Performed by: INTERNAL MEDICINE

## 2023-07-31 PROCEDURE — 85610 PROTHROMBIN TIME: CPT | Performed by: INTERNAL MEDICINE

## 2023-07-31 NOTE — PROGRESS NOTES
Mr. Pauly Rosa is here for management of anticoagulation for Afib. Pt started on Coumadin in . He presents today w/out complaint. Pt verified dosing regimen. PMH significant DMII, HTN, and chronic back pain. Pt denies s/s bleeding/bruising/swelling/SOB. Pt states he did not miss any doses. No changes in Rx/OTCs/Herbal medications. Pt takes his warfarin in the AM.   Pt has occasional EtOH use; occasional smoker. Pt states he has lost about 40 lbs     Pt finished Cephalexin. Appetite is recovering. INR is 2.2 within acceptable therapeutic range of 2-3. Recommend to continued dose of 10 mg every M/F and 5mg daily all other days until seen. Patient has 10 mg tablets  Will continue to monitor and check INR in 4 weeks. Dosing reminder card given with phone number, appointment date and time.    Return to clinic:   23 @ 10:30 am  Referring physician: Charis Tracking Only    Intervention Detail: Adherence Monitorin  Total # of Interventions Recommended: 1  Total # of Interventions Accepted: 1  Time Spent (min): 15

## 2023-08-14 NOTE — PROGRESS NOTES
and estimated at 35mmHg   (right atrial pressure 8 mmHg). Atrial fibrillation throughout the study. Labs were reviewed including labs from other hospital systems through [facility]. Cardiac testing was reviewed including echos, nuclear scans, cardiac catheterization, including from other hospital systems through [facility]. Assessment:    1. Chronic diastolic heart failure ([address])    2. Congestive heart failure, unspecified HF chronicity, unspecified heart failure type ([address])    3. Permanent atrial fibrillation ([address])    4. Essential hypertension    5. Mixed hyperlipidemia             Plan:  1. I will call you with final echo results from today  2. Continue current cardiac medications  3. Labs from today are pending  3. Follow up in 6 months    This note was scribed in the presence of Tee Zaidi MD by Obinna Barnett RN    The scribe's documentation has been prepared under my direction and personally reviewed by me in its entirety. I confirm that the note above accurately reflects all work, treatment, procedures, and medical decision making performed by me. Time Based Itemization  A total of 40 minutes was spent on today's patient encounter. If applicable, non-patient-facing activities:  ( x)Preparing to see the patient and reviewing records  ( ) Individual interpretation of results  ( ) Discussion or coordination of care with other health care professionals  ( x) Ordering of unique tests, medications, or procedures  ( x) Documentation within the EHR     I appreciate the opportunity of cooperating in the care of this patient.     Kell Foster M.D., [facility]

## 2023-08-15 ENCOUNTER — HOSPITAL ENCOUNTER (OUTPATIENT)
Dept: CARDIOLOGY | Age: 63
Discharge: HOME | End: 2023-08-15
Payer: MEDICARE

## 2023-08-15 ENCOUNTER — OFFICE VISIT (OUTPATIENT)
Dept: CARDIOLOGY CLINIC | Age: 63
End: 2023-08-15

## 2023-08-15 VITALS
HEART RATE: 68 BPM | SYSTOLIC BLOOD PRESSURE: 100 MMHG | DIASTOLIC BLOOD PRESSURE: 64 MMHG | HEIGHT: 76 IN | OXYGEN SATURATION: 95 % | WEIGHT: 286 LBS | BODY MASS INDEX: 34.83 KG/M2

## 2023-08-15 DIAGNOSIS — I50.9 CONGESTIVE HEART FAILURE, UNSPECIFIED HF CHRONICITY, UNSPECIFIED HEART FAILURE TYPE (HCC): ICD-10-CM

## 2023-08-15 DIAGNOSIS — E78.2 MIXED HYPERLIPIDEMIA: ICD-10-CM

## 2023-08-15 DIAGNOSIS — I50.32 CHRONIC DIASTOLIC HEART FAILURE (HCC): ICD-10-CM

## 2023-08-15 DIAGNOSIS — I50.32 CHRONIC DIASTOLIC HEART FAILURE (HCC): Primary | ICD-10-CM

## 2023-08-15 DIAGNOSIS — I48.21 PERMANENT ATRIAL FIBRILLATION (HCC): ICD-10-CM

## 2023-08-15 DIAGNOSIS — I10 ESSENTIAL HYPERTENSION: ICD-10-CM

## 2023-08-15 LAB
LV EF: 55 %
LVEF MODALITY: NORMAL

## 2023-08-15 PROCEDURE — 6360000004 HC RX CONTRAST MEDICATION: Performed by: INTERNAL MEDICINE

## 2023-08-15 PROCEDURE — C8929 TTE W OR WO FOL WCON,DOPPLER: HCPCS

## 2023-08-15 RX ORDER — TRIAMCINOLONE ACETONIDE 5 MG/G
CREAM TOPICAL
COMMUNITY
Start: 2023-08-14

## 2023-08-15 RX ADMIN — PERFLUTREN 1.5 ML: 6.52 INJECTION, SUSPENSION INTRAVENOUS at 10:57

## 2023-08-15 NOTE — PATIENT INSTRUCTIONS
Plan:  1. I will call you with final echo results from today  2. Continue current cardiac medications  3.  Follow up in 6 months

## 2023-08-18 ENCOUNTER — TELEPHONE (OUTPATIENT)
Dept: CARDIOLOGY CLINIC | Age: 63
End: 2023-08-18

## 2023-08-18 NOTE — TELEPHONE ENCOUNTER
LMOVM for pt to return call for result notes. ----- Message from Gila Schreiber MD sent at 8/17/2023  4:31 PM EDT -----  Please call patient and let him know that his echo showed normal heart function. Heart valves look good. Pressure in the lungs is stable. Overall, the echo is unchanged from previous which is good new!   SHOLA

## 2023-09-01 ENCOUNTER — ANTI-COAG VISIT (OUTPATIENT)
Dept: PHARMACY | Age: 63
End: 2023-09-01
Payer: MEDICARE

## 2023-09-01 DIAGNOSIS — I48.21 PERMANENT ATRIAL FIBRILLATION (HCC): Primary | ICD-10-CM

## 2023-09-01 LAB — INR BLD: 2.5

## 2023-09-01 PROCEDURE — 85610 PROTHROMBIN TIME: CPT | Performed by: FAMILY MEDICINE

## 2023-09-01 PROCEDURE — 99211 OFF/OP EST MAY X REQ PHY/QHP: CPT | Performed by: FAMILY MEDICINE

## 2023-09-01 NOTE — PROGRESS NOTES
Mr. Carlos Roman is here for management of anticoagulation for Afib. Pt started on Coumadin in . He presents today w/out complaint. Pt verified dosing regimen. PMH significant DMII, HTN, and chronic back pain. Pt denies s/s bleeding/bruising/swelling/SOB. Pt states he did not miss any doses. No changes in Rx/OTCs/Herbal medications. Pt takes his warfarin in the AM.   Pt has occasional EtOH use; occasional smoker. Pt states he has lost about 40 lbs     On another course of cephalexin     INR 2.5 is within acceptable therapeutic range of 2-3. Recommend to continued dose of 10 mg every M/F and 5mg daily all other days until seen. Patient has 10 mg tablets  Will continue to monitor and check INR in 5 weeks (normally 4 weeks)   Dosing reminder card given with phone number, appointment date and time.    Return to clinic:  10/6 @ 11 am   Referring physician: Charis Tracking Only    Intervention Detail: Adherence Monitorin  Total # of Interventions Recommended: 1  Total # of Interventions Accepted: 1  Time Spent (min): [address] PharmD 2023 11:20 AM

## 2023-10-06 ENCOUNTER — ANTI-COAG VISIT (OUTPATIENT)
Dept: PHARMACY | Age: 63
End: 2023-10-06
Payer: MEDICARE

## 2023-10-06 DIAGNOSIS — I48.21 PERMANENT ATRIAL FIBRILLATION (HCC): Primary | ICD-10-CM

## 2023-10-06 LAB — INR BLD: 2.7

## 2023-10-06 PROCEDURE — 85610 PROTHROMBIN TIME: CPT | Performed by: HOME HEALTH

## 2023-10-06 PROCEDURE — 99211 OFF/OP EST MAY X REQ PHY/QHP: CPT | Performed by: HOME HEALTH

## 2023-10-06 NOTE — PROGRESS NOTES
Mr. Genesis Almazan is here for management of anticoagulation for Afib. Pt started on Coumadin in 2013. He presents today w/out complaint. Pt verified dosing regimen. PMH significant DMII, HTN, and chronic back pain. Pt denies s/s bleeding/bruising/swelling/SOB. Pt states he did not miss any doses. No changes in Rx/OTCs/Herbal medications. Pt takes his warfarin in the AM.   Pt has occasional EtOH use; occasional smoker. Pt states he has lost about 40 lbs       INR 2.7 is within acceptable therapeutic range of 2-3. Recommend to continued dose of 10 mg every M/F and 5mg daily all other days until seen. Patient has 10 mg tablets  Will continue to monitor and check INR in 5 weeks (normally 4 weeks)   Dosing reminder card given with phone number, appointment date and time.    Return to clinic:  11/3 @ [phone]   Referring physician: [address] Only    Intervention Detail:   Total # of Interventions Recommended: 0  Total # of Interventions Accepted: 0  Time Spent (min): 15

## 2023-11-03 ENCOUNTER — ANTI-COAG VISIT (OUTPATIENT)
Dept: PHARMACY | Age: 63
End: 2023-11-03
Payer: MEDICARE

## 2023-11-03 DIAGNOSIS — I48.21 PERMANENT ATRIAL FIBRILLATION (HCC): Primary | ICD-10-CM

## 2023-11-03 LAB — INTERNATIONAL NORMALIZATION RATIO, POC: 1.8

## 2023-11-03 PROCEDURE — 99211 OFF/OP EST MAY X REQ PHY/QHP: CPT

## 2023-11-03 PROCEDURE — 85610 PROTHROMBIN TIME: CPT

## 2023-11-03 NOTE — PROGRESS NOTES
Mr. Pauly Rosa is here for management of anticoagulation for Afib. Pt started on Coumadin in . He presents today w/out complaint. Pt verified dosing regimen. PMH significant DMII, HTN, and chronic back pain. Pt denies s/s bleeding/bruising/swelling/SOB. Pt states he did not miss any doses. No changes in Rx/OTCs/Herbal medications. Pt takes his warfarin in the AM.   Pt has occasional EtOH use; occasional smoker. Pt states he has lost about 40 lbs     No changes, unsure of why INR is slightly low today. Denies missed dose or changes in diet. Keep same dose as patient has been stable on current dosing. INR 1.8  is below acceptable therapeutic range of 2-3. Recommend 15 mg today only and to continued dose of 10 mg every M/ and 5mg daily all other days until seen. Patient has 10 mg tablets  Will continue to monitor and check INR in 5 weeks (normally 4 weeks)   Dosing reminder card given with phone number, appointment date and time.    Return to clinic:   @ 1100   Referring physician: Gwen Marin, PharmD  11/3/2023 at 11:10 AM      For Pharmacy Admin Tracking Only    Intervention Detail: Adherence Monitorin  Total # of Interventions Recommended: 1  Total # of Interventions Accepted: 1  Time Spent (min): 15

## 2023-12-08 ENCOUNTER — ANTI-COAG VISIT (OUTPATIENT)
Dept: PHARMACY | Age: 63
End: 2023-12-08
Payer: MEDICARE

## 2023-12-08 DIAGNOSIS — I48.21 PERMANENT ATRIAL FIBRILLATION (HCC): Primary | ICD-10-CM

## 2023-12-08 LAB — INR BLD: 1.3

## 2023-12-08 PROCEDURE — 99211 OFF/OP EST MAY X REQ PHY/QHP: CPT | Performed by: HOME HEALTH

## 2023-12-08 PROCEDURE — 85610 PROTHROMBIN TIME: CPT | Performed by: HOME HEALTH

## 2023-12-08 NOTE — PROGRESS NOTES
Mr. Claudia Huddleston is here for management of anticoagulation for Afib. Pt started on Coumadin in 2013. He presents today w/out complaint. Pt verified dosing regimen. PMH significant DMII, HTN, and chronic back pain. Pt denies s/s bleeding/bruising/swelling/SOB. Pt states he did not miss any doses. No changes in Rx/OTCs/Herbal medications. Pt takes his warfarin in the AM.   Pt has occasional EtOH use; occasional smoker. Pt states he has lost about 40 lbs     No changes, unsure of why INR is slightly low today. Denies missed dose or changes in diet. 2 months in a row INR trending down (1.8 --> 1.3), plan for additional dose today and slight increase to dosing schedule      INR 1.3  is below acceptable therapeutic range of 2-3. Recommend 15 mg today only and to inc dose to 10 mg every M/W/F and 5mg daily all other days until seen. Patient has 10 mg tablets  Will continue to monitor and check INR in 2 weeks (normally 4 weeks)   Dosing reminder card given with phone number, appointment date and time.    Return to clinic:  12/20 @ 1100   Referring physician: Imelda Salamanca, PharmD Student [id number]

## 2024-01-17 ENCOUNTER — ANTI-COAG VISIT (OUTPATIENT)
Dept: PHARMACY | Age: 64
End: 2024-01-17
Payer: MEDICARE

## 2024-01-17 DIAGNOSIS — I48.21 PERMANENT ATRIAL FIBRILLATION (HCC): Primary | ICD-10-CM

## 2024-01-17 LAB — INTERNATIONAL NORMALIZATION RATIO, POC: 1.5

## 2024-01-17 PROCEDURE — 85610 PROTHROMBIN TIME: CPT | Performed by: INTERNAL MEDICINE

## 2024-01-17 PROCEDURE — 99211 OFF/OP EST MAY X REQ PHY/QHP: CPT | Performed by: INTERNAL MEDICINE

## 2024-01-17 NOTE — PROGRESS NOTES
Mr. Bales is here for management of anticoagulation for Afib.   Pt started on Coumadin in .  He presents today w/out complaint.  Pt verified dosing regimen.  PMH significant DMII, HTN, and chronic back pain.   Pt denies s/s bleeding/bruising/swelling/SOB.   Pt states he did not miss any doses.  No changes in Rx/OTCs/Herbal medications.  Pt takes his warfarin in the AM.   Pt has occasional EtOH use; occasional smoker.    Denies missed dose or changes in diet.  Upon questioning, he said this month he started drinking Glucerna drinks which have 25% vitamin K in them. This explains why he went from 1.9 to 1.5.  He will stop those for now, since he is not sure he can afford to be consistent with them.      INR 1.5 is below acceptable therapeutic range of 2-3. Due to vit K, will stop drinks.  Recommend 15 mg today only and to continue dose of 10 mg every M// and 5mg daily all other days until seen.   Patient has 10 mg tablets  Will continue to monitor and check INR in 1 week    Dosing reminder card given with phone number, appointment date and time.   Return to clinic:   @ 1100   Referring physician: Hua Maldonado    For Pharmacy Admin Tracking Only    Intervention Detail: Adherence Monitorin and Dose Adjustment: 1, reason: Therapy Optimization  Total # of Interventions Recommended: 1  Total # of Interventions Accepted: 1  Time Spent (min): 15

## 2024-01-24 ENCOUNTER — ANTI-COAG VISIT (OUTPATIENT)
Dept: PHARMACY | Age: 64
End: 2024-01-24
Payer: MEDICARE

## 2024-01-24 DIAGNOSIS — I48.21 PERMANENT ATRIAL FIBRILLATION (HCC): Primary | ICD-10-CM

## 2024-01-24 LAB — INTERNATIONAL NORMALIZATION RATIO, POC: 1.5

## 2024-01-24 PROCEDURE — 99211 OFF/OP EST MAY X REQ PHY/QHP: CPT | Performed by: INTERNAL MEDICINE

## 2024-01-24 PROCEDURE — 85610 PROTHROMBIN TIME: CPT | Performed by: INTERNAL MEDICINE

## 2024-01-24 NOTE — PROGRESS NOTES
Mr. Bales is here for management of anticoagulation for Afib.   Pt started on Coumadin in .  He presents today w/out complaint.  Pt verified dosing regimen.  PMH significant DMII, HTN, and chronic back pain.   Pt denies s/s bleeding/bruising/swelling/SOB.   Pt states he did not miss any doses.  No changes in Rx/OTCs/Herbal medications.  Pt takes his warfarin in the AM.   Pt has occasional EtOH use; occasional smoker.    Denies missed dose or changes in diet.  No clear explanation for continued decrease in INR.    INR 1.5 is below acceptable therapeutic range of 2-3.   Recommend increase dose to 5 mg every M/W/F and 10mg daily all other days until seen.   Patient has 10 mg tablets  Will continue to monitor and check INR in 1 week    Dosing reminder card given with phone number, appointment date and time.   Return to clinic:   @ 6041     Referring physician: Hua Maldonado  Referral date- 23    For Pharmacy Admin Tracking Only    Intervention Detail: Adherence Monitorin and Dose Adjustment: 1, reason: Therapy Optimization  Total # of Interventions Recommended: 1  Total # of Interventions Accepted: 1  Time Spent (min): 15

## 2024-02-02 ENCOUNTER — ANTI-COAG VISIT (OUTPATIENT)
Dept: PHARMACY | Age: 64
End: 2024-02-02
Payer: MEDICARE

## 2024-02-02 DIAGNOSIS — I48.21 PERMANENT ATRIAL FIBRILLATION (HCC): Primary | ICD-10-CM

## 2024-02-02 LAB — INR BLD: 1.9

## 2024-02-02 PROCEDURE — 85610 PROTHROMBIN TIME: CPT | Performed by: HOME HEALTH

## 2024-02-02 PROCEDURE — 99211 OFF/OP EST MAY X REQ PHY/QHP: CPT | Performed by: HOME HEALTH

## 2024-02-02 NOTE — PROGRESS NOTES
Mr. Bales is here for management of anticoagulation for Afib.   Pt started on Coumadin in 2013.  He presents today w/out complaint.  Pt verified dosing regimen.  PMH significant DMII, HTN, and chronic back pain.   Pt denies s/s bleeding/bruising/swelling/SOB.   Pt states he did not miss any doses.  No changes in Rx/OTCs/Herbal medications.  Pt takes his warfarin in the AM.   Pt has occasional EtOH use; occasional smoker.      INR 1.9 is within acceptable therapeutic range of 2-3, dose just increased last week.    Recommend continue 5 mg every M/W/F and 10mg daily all other days until seen.   Patient has 10 mg tablets  Will continue to monitor and check INR in 2 weeks.    Dosing reminder card given with phone number, appointment date and time.   Return to clinic:  2/16 @ 1100     Referring physician: Hua Maldonado  4/24/23        For Pharmacy Admin Tracking Only    Intervention Detail:   Total # of Interventions Recommended: 0  Total # of Interventions Accepted: 0  Time Spent (min): 15

## 2024-02-16 ENCOUNTER — APPOINTMENT (OUTPATIENT)
Dept: PHARMACY | Age: 64
End: 2024-02-16
Payer: MEDICARE

## 2024-02-16 DIAGNOSIS — I48.21 PERMANENT ATRIAL FIBRILLATION (HCC): Primary | ICD-10-CM

## 2024-02-16 LAB — INR BLD: 2

## 2024-02-16 PROCEDURE — 85610 PROTHROMBIN TIME: CPT | Performed by: HOME HEALTH

## 2024-02-16 PROCEDURE — 99211 OFF/OP EST MAY X REQ PHY/QHP: CPT | Performed by: HOME HEALTH

## 2024-02-16 NOTE — PROGRESS NOTES
Mr. Bales is here for management of anticoagulation for Afib.   Pt started on Coumadin in 2013.  He presents today w/out complaint.  Pt verified dosing regimen.  PMH significant DMII, HTN, and chronic back pain.   Pt denies s/s bleeding/bruising/swelling/SOB.   Pt states he did not miss any doses.  No changes in Rx/OTCs/Herbal medications.  Pt takes his warfarin in the AM.   Pt has occasional EtOH use; occasional smoker.    Pt is having a colonoscopy on March 26th and states he was told to hold his Warfarin 5 days prior . He sees cardiology on 3/19 and he will ask them if he needs to bridge with Lovenox or just hold.       INR 2.0 is within acceptable therapeutic range of 2-3, dose just increased last week.    Recommend continue 5 mg every M/W/F and 10mg daily all other days until seen.   Patient has 10 mg tablets  Will continue to monitor and check INR in 4 weeks.    Dosing reminder card given with phone number, appointment date and time.   Return to clinic:  3/15 @ 1100     Referring physician: Hua Maldonado  4/24/23  Cardiologist: Dr. Janet Olivire    For Pharmacy Admin Tracking Only    Intervention Detail:   Total # of Interventions Recommended: 0  Total # of Interventions Accepted: 0  Time Spent (min): 15

## 2024-03-18 NOTE — PROGRESS NOTES
today's patient encounter.  If applicable, non-patient-facing activities:  ( x)Preparing to see the patient and reviewing records  ( ) Individual interpretation of results  ( ) Discussion or coordination of care with other health care professionals  ( x) Ordering of unique tests, medications, or procedures  ( x) Documentation within the EHR     I appreciate the opportunity of cooperating in the care of this patient.    Stephanie lOivier M.D., [facility]

## 2024-03-19 ENCOUNTER — OFFICE VISIT (OUTPATIENT)
Dept: CARDIOLOGY CLINIC | Age: 64
End: 2024-03-19
Payer: MEDICARE

## 2024-03-19 VITALS
HEART RATE: 106 BPM | HEIGHT: 76 IN | WEIGHT: 294.5 LBS | SYSTOLIC BLOOD PRESSURE: 132 MMHG | OXYGEN SATURATION: 94 % | BODY MASS INDEX: 35.86 KG/M2 | DIASTOLIC BLOOD PRESSURE: 89 MMHG

## 2024-03-19 DIAGNOSIS — I10 ESSENTIAL HYPERTENSION: ICD-10-CM

## 2024-03-19 DIAGNOSIS — E78.2 MIXED HYPERLIPIDEMIA: ICD-10-CM

## 2024-03-19 DIAGNOSIS — I48.21 PERMANENT ATRIAL FIBRILLATION (HCC): ICD-10-CM

## 2024-03-19 DIAGNOSIS — I50.32 CHRONIC DIASTOLIC HEART FAILURE (HCC): Primary | ICD-10-CM

## 2024-03-19 PROCEDURE — 99215 OFFICE O/P EST HI 40 MIN: CPT | Performed by: INTERNAL MEDICINE

## 2024-03-19 PROCEDURE — 3079F DIAST BP 80-89 MM HG: CPT | Performed by: INTERNAL MEDICINE

## 2024-03-19 PROCEDURE — 3075F SYST BP GE 130 - 139MM HG: CPT | Performed by: INTERNAL MEDICINE

## 2024-03-19 PROCEDURE — 93000 ELECTROCARDIOGRAM COMPLETE: CPT | Performed by: INTERNAL MEDICINE

## 2024-03-19 NOTE — PATIENT INSTRUCTIONS
Plan:  1. EKG today for colonoscopy clearance  2. Continue current cardiac medications   Okay to continue lasix 40 mg once daily  3.  Follow up with me in 6 months

## 2024-04-03 ENCOUNTER — ANTI-COAG VISIT (OUTPATIENT)
Dept: PHARMACY | Age: 64
End: 2024-04-03
Payer: MEDICARE

## 2024-04-03 DIAGNOSIS — I48.21 PERMANENT ATRIAL FIBRILLATION (HCC): Primary | ICD-10-CM

## 2024-04-03 LAB — INR BLD: 1.4

## 2024-04-03 PROCEDURE — 85610 PROTHROMBIN TIME: CPT | Performed by: FAMILY MEDICINE

## 2024-04-03 PROCEDURE — 99211 OFF/OP EST MAY X REQ PHY/QHP: CPT | Performed by: FAMILY MEDICINE

## 2024-04-03 NOTE — PROGRESS NOTES
Mr. Bales is here for management of anticoagulation for Afib.   Pt started on Coumadin in .  He presents today w/out complaint.  Pt verified dosing regimen.  PMH significant DMII, HTN, and chronic back pain.   Pt denies s/s bleeding/bruising/swelling/SOB.   Pt states he did not miss any doses.  No changes in Rx/OTCs/Herbal medications.  Pt takes his warfarin in the AM.   Pt has occasional EtOH use; occasional smoker.      Has been back on warfarin since 3/26, after colonoscopy. INR today low at 1.4 likely due to holding for 5 days. Will have patient take 10 mg today, then continue current dose. Will see in 1 week     INR 1.4 is below acceptable therapeutic range of 2-3, dose just increased last week.    Recommend take 10 mg today, then continue 5 mg every M// and 10mg daily all other days until seen.   Patient has 10 mg tablets  Will continue to monitor and check INR in 1 week.    Dosing reminder card given with phone number, appointment date and time.   Return to clinic:  @ 1145 am   Referring physician: Hua Maldonado    Referral: Faxed 24     Juan Sharif, PharmD 4/3/2024 12:05 PM    For Pharmacy Admin Tracking Only    Intervention Detail: Adherence Monitorin and Dose Adjustment: 1, reason: Therapy Optimization  Total # of Interventions Recommended: 1  Total # of Interventions Accepted: 1  Time Spent (min): 15

## 2024-04-12 ENCOUNTER — ANTI-COAG VISIT (OUTPATIENT)
Dept: PHARMACY | Age: 64
End: 2024-04-12
Payer: MEDICARE

## 2024-04-12 DIAGNOSIS — I48.21 PERMANENT ATRIAL FIBRILLATION (HCC): Primary | ICD-10-CM

## 2024-04-12 LAB — INR BLD: 1.9

## 2024-04-12 PROCEDURE — 99211 OFF/OP EST MAY X REQ PHY/QHP: CPT

## 2024-04-12 PROCEDURE — 85610 PROTHROMBIN TIME: CPT

## 2024-04-12 NOTE — PROGRESS NOTES
Mr. Bales is here for management of anticoagulation for Afib.   Pt started on Coumadin in 2013.  He presents today w/out complaint.  Pt verified dosing regimen.  PMH significant DMII, HTN, and chronic back pain.   Pt denies s/s bleeding/bruising/swelling/SOB.   Pt states he did not miss any doses.  No changes in Rx/OTCs/Herbal medications.  Pt takes his warfarin in the AM.   Pt has occasional EtOH use; occasional smoker.      INR 1.9 is at acceptable therapeutic range of 2-3, dose just increased last week.    Recommend take Warfarin 5 mg every M/W/F and 10mg daily all other days until seen.   Patient has 10 mg tablets  Will continue to monitor and check INR in 4 week.    Dosing reminder card given with phone number, appointment date and time.   Return to clinic: 5/10 @ 1145 am   Referring physician: Hua Maldonado    Referral: Faxed 4/2/24       For Pharmacy Admin Tracking Only    Intervention Detail:   Total # of Interventions Recommended: 0  Total # of Interventions Accepted: 0  Time Spent (min): 15

## 2024-08-14 ENCOUNTER — ANTI-COAG VISIT (OUTPATIENT)
Dept: PHARMACY | Age: 64
End: 2024-08-14
Payer: MEDICARE

## 2024-08-14 DIAGNOSIS — I48.21 PERMANENT ATRIAL FIBRILLATION (HCC): Primary | ICD-10-CM

## 2024-08-14 LAB
INTERNATIONAL NORMALIZATION RATIO, POC: 5.4
PROTHROMBIN TIME, POC: 0

## 2024-08-14 NOTE — PROGRESS NOTES
Mr. Bales is here for management of anticoagulation for Afib.   Pt started on Coumadin in .  He presents today w/out complaint.  Pt verified dosing regimen.  PMH significant DMII, HTN, and chronic back pain.   Pt denies s/s bleeding/bruising/swelling/SOB.   Pt states he did not miss any doses.  No changes in Rx/OTCs/Herbal medications.  Pt takes his warfarin in the AM.   Pt has occasional EtOH use; occasional smoker.    Patient has not been here since April d/t transportation issues  No changes per patient  Confirmed dosing with patient  Unclear reason for high INR today    INR 5.4 is above the acceptable therapeutic range of 2-3, dose just increased last week.    Recommend to hold today and tomorrow then reducing warfarin dosing to 10 mg Sun, Tue, Thu and 5 mg all other days.  Patient has 10 mg tablets  Will continue to monitor and check INR in 2 week.    Dosing reminder card given with phone number, appointment date and time.   Return to clinic:  @ 1145 am   Referring physician: Hua Maldonado    Referral: Faxed 24     Laisha Flowers, PharmD  8/15/2024 at 12:13 PM    For Pharmacy Admin Tracking Only    Intervention Detail: Adherence Monitorin and Dose Adjustment: 1, reason: Therapy De-escalation  Total # of Interventions Recommended: 2  Total # of Interventions Accepted: 2  Time Spent (min): 15

## 2024-08-15 PROCEDURE — 99211 OFF/OP EST MAY X REQ PHY/QHP: CPT | Performed by: PHARMACIST

## 2024-08-15 PROCEDURE — 85610 PROTHROMBIN TIME: CPT | Performed by: PHARMACIST

## 2024-08-28 ENCOUNTER — ANTI-COAG VISIT (OUTPATIENT)
Dept: PHARMACY | Age: 64
End: 2024-08-28
Payer: MEDICARE

## 2024-08-28 DIAGNOSIS — I48.21 PERMANENT ATRIAL FIBRILLATION (HCC): Primary | ICD-10-CM

## 2024-08-28 LAB
INTERNATIONAL NORMALIZATION RATIO, POC: 2.8
PROTHROMBIN TIME, POC: 0

## 2024-08-28 PROCEDURE — 99211 OFF/OP EST MAY X REQ PHY/QHP: CPT | Performed by: INTERNAL MEDICINE

## 2024-08-28 PROCEDURE — 85610 PROTHROMBIN TIME: CPT | Performed by: INTERNAL MEDICINE

## 2024-08-28 NOTE — PROGRESS NOTES
Mr. Bales is here for management of anticoagulation for Afib.   Pt started on Coumadin in .  He presents today w/out complaint.  Pt verified dosing regimen.  PMH significant DMII, HTN, and chronic back pain.   Pt denies s/s bleeding/bruising/swelling/SOB.   Pt states he did not miss any doses.  No changes in Rx/OTCs/Herbal medications.  Pt takes his warfarin in the AM.   Pt has occasional EtOH use; occasional smoker.      INR 2.8 is within the acceptable therapeutic range of 2-3.    Recommend to continue warfarin 10 mg Sun, Tue, Thu and 5 mg all other days.  Patient has 10 mg tablets  Will continue to monitor and check INR in 3-4 weeks.    Dosing reminder card given with phone number, appointment date and time.   Return to clinic:  @ 1130 am     Referring physician: Hua Maldonado CPA signed  Referral: 24      For Pharmacy Admin Tracking Only    Intervention Detail: Adherence Monitorin and Dose Adjustment: 1, reason: Therapy De-escalation  Total # of Interventions Recommended: 2  Total # of Interventions Accepted: 2  Time Spent (min): 15

## 2024-09-25 ENCOUNTER — HOSPITAL ENCOUNTER (OUTPATIENT)
Age: 64
Setting detail: SPECIMEN
Discharge: HOME | End: 2024-09-25
Payer: MEDICARE

## 2024-09-25 ENCOUNTER — ANTI-COAG VISIT (OUTPATIENT)
Dept: PHARMACY | Age: 64
End: 2024-09-25
Payer: MEDICARE

## 2024-09-25 DIAGNOSIS — I48.21 PERMANENT ATRIAL FIBRILLATION (HCC): Primary | ICD-10-CM

## 2024-09-25 DIAGNOSIS — I48.21 PERMANENT ATRIAL FIBRILLATION (HCC): ICD-10-CM

## 2024-09-25 LAB
INR PPP: 7.52 (ref 0.85–1.15)
PROTHROMBIN TIME: 62.5 SEC (ref 11.9–14.9)

## 2024-09-25 PROCEDURE — 36415 COLL VENOUS BLD VENIPUNCTURE: CPT

## 2024-09-25 PROCEDURE — 99212 OFFICE O/P EST SF 10 MIN: CPT

## 2024-09-25 PROCEDURE — 85610 PROTHROMBIN TIME: CPT

## 2024-09-27 ENCOUNTER — ANTI-COAG VISIT (OUTPATIENT)
Dept: PHARMACY | Age: 64
End: 2024-09-27
Payer: MEDICARE

## 2024-09-27 DIAGNOSIS — I48.21 PERMANENT ATRIAL FIBRILLATION (HCC): Primary | ICD-10-CM

## 2024-09-27 LAB
INTERNATIONAL NORMALIZATION RATIO, POC: 4.8
PROTHROMBIN TIME, POC: 0

## 2024-09-27 PROCEDURE — 99211 OFF/OP EST MAY X REQ PHY/QHP: CPT

## 2024-09-27 PROCEDURE — 85610 PROTHROMBIN TIME: CPT

## 2024-09-30 ENCOUNTER — ANTI-COAG VISIT (OUTPATIENT)
Dept: PHARMACY | Age: 64
End: 2024-09-30
Payer: MEDICARE

## 2024-09-30 DIAGNOSIS — I48.21 PERMANENT ATRIAL FIBRILLATION (HCC): Primary | ICD-10-CM

## 2024-09-30 LAB
INTERNATIONAL NORMALIZATION RATIO, POC: 2
PROTHROMBIN TIME, POC: 0

## 2024-09-30 PROCEDURE — 85610 PROTHROMBIN TIME: CPT

## 2024-09-30 PROCEDURE — 99211 OFF/OP EST MAY X REQ PHY/QHP: CPT

## 2024-09-30 NOTE — PROGRESS NOTES
Mr. Bales is here for management of anticoagulation for Afib. Pt started on Coumadin in 2013. PMH significant DMII, HTN, and chronic back pain. He presents today w/out complaint.    Pt verified dosing regimen.  Pt states he did not miss/take extra doses.  Pt denies s/sx bleeding/bruising/swelling/SOB.   Pt denies changes in Rx/OTCs/Herbal medications since previous visit.  Pt takes his warfarin in the AM   - On appointment days he waits to take until after the appointment.  Pt has occasional EtOH use; occasional smoker.    INR 2.0 is WITHIN  the acceptable therapeutic range of 2-3.    Recommend to CONTINE 10mg every Tue/Thur and 5mg all other days  Patient has 10 mg tablets  Will continue to monitor and check INR in ~7 days  Dosing reminder card given with phone number, appointment date and time.   Return to clinic: 10/9/24 @ 10:15am     Oriana HewittD 9/30/2024 11:46 AM     Referring physician: Dr. Hua Maldonado    CPA signed  Referral: 4/2/24    For Pharmacy Admin Tracking Only  Intervention Detail:   Total # of Interventions Recommended: 0  Total # of Interventions Accepted: 0  Time Spent (min): 15

## 2024-10-11 ENCOUNTER — ANTI-COAG VISIT (OUTPATIENT)
Dept: PHARMACY | Age: 64
End: 2024-10-11
Payer: MEDICARE

## 2024-10-11 DIAGNOSIS — I48.21 PERMANENT ATRIAL FIBRILLATION (HCC): Primary | ICD-10-CM

## 2024-10-11 LAB
INTERNATIONAL NORMALIZATION RATIO, POC: 5.1
PROTHROMBIN TIME, POC: 0

## 2024-10-11 PROCEDURE — 85610 PROTHROMBIN TIME: CPT

## 2024-10-11 PROCEDURE — 99211 OFF/OP EST MAY X REQ PHY/QHP: CPT

## 2024-10-11 NOTE — PROGRESS NOTES
Mr. Bales is here for management of anticoagulation for Afib. Pt started on Coumadin in 2013. PMH significant DMII, HTN, and chronic back pain. He presents today w/out complaint.    Pt recently retired and is experiencing some financial difficulties as a result of that.  Pt verified dosing regimen.  Pt states he did not miss/take extra doses.  Pt denies s/sx bleeding/bruising/swelling/chest pain/SOB.   Pt denies changes in Rx/OTCs/Herbal medications since previous visit.  Pt takes his warfarin in the AM (On appointment days he waits to take until after the appointment).  Pt denies significant changes in diet  Pt has occasional EtOH use; occasional smoker.    INR 5.1 is ABOVE  the acceptable therapeutic range of 2-3.    -  Would ideally see patient on 10/14/24, but patient currently has transportation issues and          cannot be seen until 10/16/24.   -  Given supratherapeutic INR, reviewed s/sx of bleeding/bruising with patient and informed      patient when he should contact the coumadin clinic, when he should contact provider, and      when to go to emergency room.  Recommend to HOLD dose on 10/11/24 and 10/12/24 and then reduce dose to 10mg on Thursday and 5mg daily all other days  Patient has 10 mg tablets  Will continue to monitor and check INR in 5 days  Dosing reminder card given with phone number, appointment date and time.   Return to clinic: 11/16/24    Bunny Moore, PharmD 10/11/2024 10:15 AM    Referring physician: Dr. Hua Maldonado    CPA signed  Referral: 4/2/24    For Pharmacy Admin Tracking Only  Intervention Detail: Dose Adjustment: 1, reason: Therapy Optimization  Total # of Interventions Recommended: 1  Total # of Interventions Accepted: 1  Time Spent (min): 15

## 2024-10-16 ENCOUNTER — ANTI-COAG VISIT (OUTPATIENT)
Dept: PHARMACY | Age: 64
End: 2024-10-16
Payer: MEDICARE

## 2024-10-16 DIAGNOSIS — I48.21 PERMANENT ATRIAL FIBRILLATION (HCC): Primary | ICD-10-CM

## 2024-10-16 LAB
INTERNATIONAL NORMALIZATION RATIO, POC: 2.4
PROTHROMBIN TIME, POC: 0

## 2024-10-16 PROCEDURE — 85610 PROTHROMBIN TIME: CPT

## 2024-10-16 PROCEDURE — 99211 OFF/OP EST MAY X REQ PHY/QHP: CPT

## 2024-10-16 NOTE — PROGRESS NOTES
Mr. Bales is here for management of anticoagulation for Afib. Pt started on Coumadin in . PMH significant DMII, HTN, and chronic back pain. He presents today w/out complaint.    Pt recently retired and is experiencing some financial difficulties as a result of that.  Pt verified dosing regimen.  Pt states he did not miss/take extra doses.  Pt denies s/sx bleeding/bruising/swelling/chest pain/SOB.   Pt denies changes in Rx/OTCs/Herbal medications since previous visit.  Pt takes his warfarin in the AM (On appointment days he waits to take until after the appointment).  Pt denies significant changes in diet  Pt has occasional EtOH use; occasional smoker.    Pt expressed interest in utilize a home INR testing machine. Discussed with patient and gave patient an informational booklet to read on it. Patient agreed to discuss further at next visit    INR 2.4 is WITHIN  the acceptable therapeutic range of 2-3.    Recommend to take 5mg daily  Patient has 10 mg tablets  Will continue to monitor and check INR in 2 weeks (Would ideally see patient sooner given previous supratherapeutic INRs, but patient prefers to space appointments due to transportation issues and cost)  Dosing reminder card given with phone number, appointment date and time.   Return to clinic: 10/30/24 at 11:45    Bunny Moore PharmD 10/16/2024 11:32 AM    Referring physician: Dr. Hua Maldonado    CPA signed  Referral: 24    For Pharmacy Admin Tracking Only  Intervention Detail: Adherence Monitorin and Dose Adjustment: 1, reason: Therapy Optimization  Total # of Interventions Recommended: 1  Total # of Interventions Accepted: 1  Time Spent (min): 15

## 2024-10-30 ENCOUNTER — ANTI-COAG VISIT (OUTPATIENT)
Dept: PHARMACY | Age: 64
End: 2024-10-30
Payer: MEDICARE

## 2024-10-30 DIAGNOSIS — I48.21 PERMANENT ATRIAL FIBRILLATION (HCC): Primary | ICD-10-CM

## 2024-10-30 LAB
INTERNATIONAL NORMALIZATION RATIO, POC: 2.4
PROTHROMBIN TIME, POC: 0

## 2024-10-30 PROCEDURE — 99211 OFF/OP EST MAY X REQ PHY/QHP: CPT

## 2024-10-30 PROCEDURE — 85610 PROTHROMBIN TIME: CPT

## 2024-10-30 NOTE — PROGRESS NOTES
Mr. Bales is here for management of anticoagulation for Afib. Pt started on Coumadin in 2013. PMH significant T2DM, HTN, and chronic back pain. He presents today w/out complaint.    Pt recently retired and is experiencing some financial difficulties as a result of that.  Pt verified dosing regimen.  Pt states he did not miss/take extra doses.  Pt denies s/sx bleeding/bruising/swelling/chest pain/SOB.   Pt denies changes in Rx/OTCs/Herbal medications since previous visit.  Pt takes his warfarin in the AM (On appointment days he waits to take until after the appointment).  Pt denies significant changes in diet  Pt has occasional EtOH use; occasional smoker.  Pt expressed interest in utilize a home INR testing machine.     INR 2.4 is WITHIN  the acceptable therapeutic range of 2-3.    Recommend to take 5mg daily  Patient has 10 mg tablets  Will continue to monitor and check INR in 4 weeks (Would ideally see patient sooner given previous supratherapeutic INRs, but patient prefers to space appointments due to transportation issues and cost)  Dosing reminder card given with phone number, appointment date and time.   Return to clinic: 11/27/24    Bunny Moore, PharmD 10/30/2024 10:02 AM    Referring physician: Dr. Hua Maldonado    CPA signed  Referral: 4/2/24    For Pharmacy Admin Tracking Only  Intervention Detail:   Total # of Interventions Recommended: 0  Total # of Interventions Accepted: 0  Time Spent (min): 15

## 2024-11-12 ENCOUNTER — OFFICE VISIT (OUTPATIENT)
Dept: CARDIOLOGY CLINIC | Age: 64
End: 2024-11-12

## 2024-11-12 VITALS
HEART RATE: 92 BPM | SYSTOLIC BLOOD PRESSURE: 118 MMHG | BODY MASS INDEX: 38.11 KG/M2 | WEIGHT: 313 LBS | HEIGHT: 76 IN | DIASTOLIC BLOOD PRESSURE: 84 MMHG | OXYGEN SATURATION: 90 %

## 2024-11-12 DIAGNOSIS — I10 ESSENTIAL HYPERTENSION: ICD-10-CM

## 2024-11-12 DIAGNOSIS — G47.33 OBSTRUCTIVE SLEEP APNEA SYNDROME: ICD-10-CM

## 2024-11-12 DIAGNOSIS — I48.21 PERMANENT ATRIAL FIBRILLATION (HCC): ICD-10-CM

## 2024-11-12 DIAGNOSIS — E78.2 MIXED HYPERLIPIDEMIA: ICD-10-CM

## 2024-11-12 DIAGNOSIS — I42.9 CARDIOMYOPATHY, UNSPECIFIED TYPE (HCC): ICD-10-CM

## 2024-11-12 DIAGNOSIS — I50.32 CHRONIC DIASTOLIC HEART FAILURE (HCC): Primary | ICD-10-CM

## 2024-11-12 RX ORDER — CETIRIZINE HYDROCHLORIDE 10 MG/1
10 TABLET ORAL DAILY
COMMUNITY

## 2024-11-12 NOTE — PROGRESS NOTES
[facility]   Advanced Heart Failure/Pulmonary Hypertension  Cardiac Evaluation      Cristobal Bales  YOB: 1960    Date of Visit:  11/12/24    Chief Complaint   Patient presents with    Congestive Heart Failure         History of Present Illness:  Cristobal Bales is a 64 y.o. male who presents for follow up for CHF. He was admitted from 12/26/16-01/01/17 to [facility] with c/o sob and CHA on going since October this year. Pt was seen by PCP who treated him for pneumonia. 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 He is having to sit up most of the night as a result of persistent nocturnal coughing fits. He report snoring and problem having a sufficient sleep at night, PCP has been talking to him about getting a sleep study done. Pt is an active smoker, uses inhaler at home but unaware of any formal diagnosis of COPD. He is morbidly obese with chronic venous edema/stasis of BL lower ext which has not changed much from baseline. Pt was found to be hypoxic in the mid 80s on RA which is new. CTA chest was negative for PE but showed bilateral areas of opacities, WBC was normal. Pt has baseline hx of HTN, Afib and obesity. He says that his swelling in legs is better but has significant abdominal swelling. He is on coumadin which is managed by his PCP.   Echo done on 5/11/18 shows EF 55% (see report below).    On 06/16/20 his weight was up 20 lbs since 09/2019. He reports poor diet choices during the COVID 19 pandemic shut down.   His echo from 12/08/20 showed RV dilated and normal EF of 50%.    On 12/08/20 he reported he was unable to use the CPAP.    At OV 8/31/2021 he reported his weight was down 11 lbs with better diet choices.    OV, 4/26/2022, He reports his PCP added Farxiga to assist with his diabetes management.    OV, 2/21/2023, he says he is doing well. He is still losing

## 2024-11-12 NOTE — PATIENT INSTRUCTIONS
Your provider has ordered testing for further evaluation.  An order/prescription has been included in your paper work.   To schedule outpatient testing, contact Central Scheduling by calling [facility] ([phone]).

## 2024-11-27 ENCOUNTER — ANTI-COAG VISIT (OUTPATIENT)
Dept: PHARMACY | Age: 64
End: 2024-11-27
Payer: MEDICARE

## 2024-11-27 ENCOUNTER — HOSPITAL ENCOUNTER (OUTPATIENT)
Age: 64
Setting detail: SPECIMEN
Discharge: HOME | End: 2024-11-27
Payer: MEDICARE

## 2024-11-27 DIAGNOSIS — I48.21 PERMANENT ATRIAL FIBRILLATION (HCC): Primary | ICD-10-CM

## 2024-11-27 DIAGNOSIS — I42.9 CARDIOMYOPATHY, UNSPECIFIED TYPE (HCC): ICD-10-CM

## 2024-11-27 DIAGNOSIS — I50.32 CHRONIC DIASTOLIC HEART FAILURE (HCC): ICD-10-CM

## 2024-11-27 LAB
ANION GAP SERPL CALCULATED.3IONS-SCNC: 12 MMOL/L (ref 3–16)
BASOPHILS # BLD: 0 K/UL (ref 0–0.2)
BASOPHILS NFR BLD: 0.7 %
BUN SERPL-MCNC: 12 MG/DL (ref 7–20)
CALCIUM SERPL-MCNC: 9.3 MG/DL (ref 8.3–10.6)
CHLORIDE SERPL-SCNC: 93 MMOL/L (ref 99–110)
CO2 SERPL-SCNC: 33 MMOL/L (ref 21–32)
CREAT SERPL-MCNC: 0.8 MG/DL (ref 0.8–1.3)
DEPRECATED RDW RBC AUTO: 17.5 % (ref 12.4–15.4)
EOSINOPHIL # BLD: 0.3 K/UL (ref 0–0.6)
EOSINOPHIL NFR BLD: 4.5 %
GFR SERPLBLD CREATININE-BSD FMLA CKD-EPI: >90 ML/MIN/{1.73_M2}
GLUCOSE SERPL-MCNC: 178 MG/DL (ref 70–99)
HCT VFR BLD AUTO: 53.8 % (ref 40.5–52.5)
HGB BLD-MCNC: 17.3 G/DL (ref 13.5–17.5)
INTERNATIONAL NORMALIZATION RATIO, POC: 2.9
LYMPHOCYTES # BLD: 1.3 K/UL (ref 1–5.1)
LYMPHOCYTES NFR BLD: 22.5 %
MCH RBC QN AUTO: 28.6 PG (ref 26–34)
MCHC RBC AUTO-ENTMCNC: 32.2 G/DL (ref 31–36)
MCV RBC AUTO: 88.8 FL (ref 80–100)
MONOCYTES # BLD: 0.7 K/UL (ref 0–1.3)
MONOCYTES NFR BLD: 12.1 %
NEUTROPHILS # BLD: 3.4 K/UL (ref 1.7–7.7)
NEUTROPHILS NFR BLD: 60.2 %
NT-PROBNP SERPL-MCNC: 831 PG/ML (ref 0–124)
PLATELET # BLD AUTO: 159 K/UL (ref 135–450)
PMV BLD AUTO: 9.6 FL (ref 5–10.5)
POTASSIUM SERPL-SCNC: 4.3 MMOL/L (ref 3.5–5.1)
PROTHROMBIN TIME, POC: 0
RBC # BLD AUTO: 6.06 M/UL (ref 4.2–5.9)
SODIUM SERPL-SCNC: 138 MMOL/L (ref 136–145)
WBC # BLD AUTO: 5.7 K/UL (ref 4–11)

## 2024-11-27 PROCEDURE — 36415 COLL VENOUS BLD VENIPUNCTURE: CPT

## 2024-11-27 PROCEDURE — 99211 OFF/OP EST MAY X REQ PHY/QHP: CPT

## 2024-11-27 PROCEDURE — 80048 BASIC METABOLIC PNL TOTAL CA: CPT

## 2024-11-27 PROCEDURE — 83880 ASSAY OF NATRIURETIC PEPTIDE: CPT

## 2024-11-27 PROCEDURE — 85025 COMPLETE CBC W/AUTO DIFF WBC: CPT

## 2024-11-27 PROCEDURE — 85610 PROTHROMBIN TIME: CPT

## 2024-11-27 NOTE — PROGRESS NOTES
Mr. Bales is here for management of anticoagulation for Afib. Pt started on Coumadin in 2013. PMH significant T2DM, HTN, and chronic back pain. He presents today w/out complaint.    Patient recently retired and is experiencing some financial difficulties as a result of that.  Patient verified dosing regimen.  Patient states he did not miss/take extra doses.  Patient denies s/sx bleeding/bruising/swelling/chest pain/SOB/CP   Patient denies changes in Rx/OTCs/Herbal medications since previous visit.  Patient denies significant changes in diet  Patient has occasional EtOH use; occasional smoker.  Patient takes his warfarin in the AM   (On appointment days he waits to take until after the appointment).    INR 2.9 is WITHIN  the acceptable therapeutic range of 2-3.    Recommend to take 5mg daily  Patient has 10 mg tablets  Will continue to monitor and check INR in 4 weeks   Patient prefers to space appointments due to transportation issues and cost)  Dosing reminder card given with phone number, appointment date and time.   Return to clinic: 12/27/24 at 11:00    Oriana HewittD 11/27/2024 10:47 AM    Referring physician: Dr. Hua Maldonado    CPA signed  Referral: 4/2/24    For Pharmacy Admin Tracking Only  Intervention Detail:   Total # of Interventions Recommended: 0  Total # of Interventions Accepted: 0  Time Spent (min): 15

## 2024-12-27 ENCOUNTER — ANTI-COAG VISIT (OUTPATIENT)
Dept: PHARMACY | Age: 64
End: 2024-12-27
Payer: MEDICARE

## 2024-12-27 DIAGNOSIS — I48.21 PERMANENT ATRIAL FIBRILLATION (HCC): Primary | ICD-10-CM

## 2024-12-27 LAB
INTERNATIONAL NORMALIZATION RATIO, POC: 2.1
PROTHROMBIN TIME, POC: 0

## 2024-12-27 PROCEDURE — 99211 OFF/OP EST MAY X REQ PHY/QHP: CPT

## 2024-12-27 PROCEDURE — 85610 PROTHROMBIN TIME: CPT

## 2024-12-27 NOTE — PROGRESS NOTES
Mr. Bales is here for management of anticoagulation for Afib. Patient started on Coumadin in 2013. PMH significant T2DM, HTN, and chronic back pain.    Patient presents today w/out complaint.  Patient recently retired and is experiencing some financial difficulties as a result of that.  Patient verified dosing regimen.  Patient states he did not miss/take extra doses.  Patient denies s/sx bleeding/bruising/swelling/chest pain/SOB/CP   Patient denies changes in Rx/OTCs/Herbal medications since previous visit.  Patient denies significant changes in diet  Patient has occasional EtOH use; occasional smoker.  Patient takes his warfarin in the AM   (On appointment days he waits to take until after the appointment).    INR 2.1 is WITHIN  the acceptable therapeutic range of 2-3.    Recommend to take 5mg daily  Patient has 10 mg tablets  Will continue to monitor and check INR in 4 weeks   Patient prefers to space appointments due to transportation issues and cost  Dosing reminder card given with phone number, appointment date and time.   Return to clinic: 12/27/24 at 11:00    Bunny Moore PharmD 12/27/2024 10:52 AM    Referring physician: Dr. Hua Maldonado    CPA signed  Referral: 4/2/24    For Pharmacy Admin Tracking Only  Intervention Detail:   Total # of Interventions Recommended: 0  Total # of Interventions Accepted: 0  Time Spent (min): 15

## 2025-01-24 ENCOUNTER — ANTI-COAG VISIT (OUTPATIENT)
Dept: PHARMACY | Age: 65
End: 2025-01-24

## 2025-01-24 DIAGNOSIS — I48.21 PERMANENT ATRIAL FIBRILLATION (HCC): Primary | ICD-10-CM

## 2025-01-24 LAB
INTERNATIONAL NORMALIZATION RATIO, POC: 1.8
PROTHROMBIN TIME, POC: 0

## 2025-01-24 NOTE — PROGRESS NOTES
Mr. Cristobal Bales is a 64 y.o. y/o male with history of Afib who presents today for anticoagulation monitoring and adjustment.  Patient started on Coumadin in 2013.    Pertinent PMH: T2DM, HTN, and chronic back pain.    Patient Reported Findings:  Yes     No  [x]   []       Patient verifies current dosing regimen as listed  - Patient has warfarin 10 mg tablets  - Warfarin 5mg daily  []   [x]       S/Sx bleeding/bruising/swelling/SOB  []   [x]       Blood in urine or stool  []   [x]       Procedures scheduled in the future at this time  []   [x]       Missed Dose  []   [x]       Extra Dose  []   [x]       Change in medications  []   [x]       Change in health/diet/appetite  []   [x]       Change in alcohol use  []   [x]       Change in activity  []   [x]       Hospital admission  []   [x]       Emergency department visit  [x]   []       Other complaints  - Patient reports needing refill of warfarin  - Patient would like to switch to 5mg tablets so he doesn't have to split in half every day    Clinical Outcomes:  Yes     No  []   [x]       Major bleeding event  []   [x]       Thromboembolic event    INR (no units)   Date Value   09/25/2024 7.52 (HH)   04/12/2024 1.9   04/03/2024 1.4   02/16/2024 2.0     INR,(POC) (no units)   Date Value   01/24/2025 1.8   12/27/2024 2.1   11/27/2024 2.9   10/30/2024 2.4       INR 1.8 is BELOW the acceptable therapeutic range of 2-3.    Recommend to take 10 mg today then continue 5mg daily  Will continue to monitor and check INR in 1-2 weeks   Dosing reminder card given with phone number, appointment date and time.   Return to clinic: 2/5/2025    Warfarin prescription phoned into [facility][id number] to [facility] under Dr. Hua Maldonado   Warfarin 5 mg tabs  Sig: Take 1 tablet by mouth once daily or as directed by anticoagulation clinic  Qty: 90  Refills: 3    Patient instructed to discard 10 mg tablets when he picks up 5 mg tablets    Bunny Moore, PharmD 1/24/2025 11:12

## 2025-02-05 ENCOUNTER — ANTI-COAG VISIT (OUTPATIENT)
Dept: PHARMACY | Age: 65
End: 2025-02-05
Payer: MEDICARE

## 2025-02-05 DIAGNOSIS — I48.21 PERMANENT ATRIAL FIBRILLATION (HCC): Primary | ICD-10-CM

## 2025-02-05 LAB
INTERNATIONAL NORMALIZATION RATIO, POC: 1.6
PROTHROMBIN TIME, POC: 0

## 2025-02-05 PROCEDURE — 99211 OFF/OP EST MAY X REQ PHY/QHP: CPT

## 2025-02-05 PROCEDURE — 85610 PROTHROMBIN TIME: CPT

## 2025-02-05 NOTE — PROGRESS NOTES
Mr. Cristobal Bales is a 64 y.o. y/o male with history of Afib who presents today for anticoagulation monitoring and adjustment.  Patient started on Coumadin in .    Pertinent PMH: T2DM, HTN, and chronic back pain.    Patient Reported Findings:  Yes     No  [x]   []       Patient verifies current dosing regimen as listed  - Patient has warfarin 5 mg tablets  - Warfarin 5mg daily  []   [x]       S/Sx bleeding/bruising/swelling/SOB  []   [x]       Blood in urine or stool  []   [x]       Procedures scheduled in the future at this time  []   [x]       Missed Dose  []   [x]       Extra Dose  []   [x]       Change in medications  [x]   []       Change in health/diet/appetite  - Patient reports eating more salads lately  - Patient reports having ensure shakes (2-3 x week)  []   [x]       Change in alcohol use  []   [x]       Change in activity  []   [x]       Hospital admission  []   [x]       Emergency department visit  []   [x]       Other complaints      Clinical Outcomes:  Yes     No  []   [x]       Major bleeding event  []   [x]       Thromboembolic event    INR (no units)   Date Value   2024 7.52 (HH)   2024 1.9   2024 1.4   2024 2.0     INR,(POC) (no units)   Date Value   2025 1.8   2024 2.1   2024 2.9   10/30/2024 2.4       INR 1.8 is BELOW the acceptable therapeutic range of 2-3.    Recommend to INCREASE to 7.5 mg every Wed; 5 mg all other days (7.1%)  Will continue to monitor and check INR in 1-2 weeks   Dosing reminder card given with phone number, appointment date and time.   Return to clinic: 2025    Bunny Moore, PharmD 2025 10:28 AM    Referring physician: Dr. Hua Maldonado    CPA signed  Referral: 24    For Pharmacy Admin Tracking Only  Intervention Detail: Adherence Monitorin and Dose Adjustment: 1, reason: Therapy Optimization  Total # of Interventions Recommended: 2  Total # of Interventions Accepted: 2  Time Spent (min): 15

## 2025-02-19 ENCOUNTER — ANTI-COAG VISIT (OUTPATIENT)
Dept: PHARMACY | Age: 65
End: 2025-02-19
Payer: MEDICARE

## 2025-02-19 DIAGNOSIS — I48.21 PERMANENT ATRIAL FIBRILLATION (HCC): Primary | ICD-10-CM

## 2025-02-19 LAB
INTERNATIONAL NORMALIZATION RATIO, POC: 2.4
PROTHROMBIN TIME, POC: 0

## 2025-02-19 PROCEDURE — 99211 OFF/OP EST MAY X REQ PHY/QHP: CPT

## 2025-02-19 PROCEDURE — 85610 PROTHROMBIN TIME: CPT

## 2025-02-19 RX ORDER — M-VIT,TX,IRON,MINS/CALC/FOLIC 27MG-0.4MG
1 TABLET ORAL DAILY
COMMUNITY

## 2025-02-19 NOTE — PROGRESS NOTES
Mr. Cristobal Bales is a 64 y.o. y/o male with history of Afib who presents today for anticoagulation monitoring and adjustment.  Patient started on Coumadin in 2013.    Pertinent PMH: T2DM, HTN, and chronic back pain.    Patient Reported Findings:  Yes     No  [x]   []       Patient verifies current dosing regimen as listed  - Patient has warfarin 5 mg tablets  - Warfarin 7.5 mg every Wed; 5 mg all other days  []   [x]       S/Sx bleeding/bruising/swelling/SOB/CP  []   [x]       Blood in urine or stool  []   [x]       Procedures scheduled in the future at this time  []   [x]       Missed Dose  []   [x]       Extra Dose  [x]   [x]       Change in medications  - Patient taking guaifenesin  - Patient reports taking a multivitamin ([employer] brand)   [x]   []       Change in health/diet/appetite  - Patient reports eating more salads lately  - Patient reports having ensure shakes (2-3 x week)  []   [x]       Change in alcohol use  []   [x]       Change in activity  []   [x]       Hospital admission  []   [x]       Emergency department visit  []   [x]       Other complaints      Clinical Outcomes:  Yes     No  []   [x]       Major bleeding event  []   [x]       Thromboembolic event    INR (no units)   Date Value   09/25/2024 7.52 (HH)   04/12/2024 1.9   04/03/2024 1.4   02/16/2024 2.0     INR,(POC) (no units)   Date Value   02/05/2025 1.6   01/24/2025 1.8   12/27/2024 2.1   11/27/2024 2.9       INR 2.4 is WITHIN the acceptable therapeutic range of 2-3.    Recommend to CONTINUE 7.5 mg every Wed; 5 mg all other days  Will continue to monitor and check INR in 1-2 weeks   Dosing reminder card given with phone number, appointment date and time.   Return to clinic: 2/26/2025    Bunny Moore, PharmD 2/19/2025 9:28 AM    Referring physician: Dr. Hua Maldonado    CPA signed  Referral: 4/2/24    For Pharmacy Admin Tracking Only  Intervention Detail:   Total # of Interventions Recommended: 0  Total # of Interventions

## 2025-02-26 ENCOUNTER — ANTI-COAG VISIT (OUTPATIENT)
Dept: PHARMACY | Age: 65
End: 2025-02-26
Payer: MEDICARE

## 2025-02-26 DIAGNOSIS — I48.21 PERMANENT ATRIAL FIBRILLATION (HCC): Primary | ICD-10-CM

## 2025-02-26 LAB
INTERNATIONAL NORMALIZATION RATIO, POC: 1.9
PROTHROMBIN TIME, POC: 0

## 2025-02-26 PROCEDURE — 85610 PROTHROMBIN TIME: CPT

## 2025-02-26 PROCEDURE — 99211 OFF/OP EST MAY X REQ PHY/QHP: CPT

## 2025-02-26 NOTE — PROGRESS NOTES
Mr. Cristobal Bales is a 64 y.o. y/o male with history of Afib who presents today for anticoagulation monitoring and adjustment.  Patient started on Coumadin in 2013.    Pertinent PMH: T2DM, HTN, and chronic back pain.    Patient Reported Findings:  Yes     No  [x]   []       Patient verifies current dosing regimen as listed  - Patient has warfarin 5 mg tablets  - Warfarin 7.5 mg every Wed; 5 mg all other days  []   [x]       S/Sx bleeding/bruising/swelling/SOB/CP  []   [x]       Blood in urine or stool  []   [x]       Procedures scheduled in the future at this time  []   [x]       Missed Dose  []   [x]       Extra Dose  [x]   []       Change in medications  - stopped taking guaifenesin   - Patient reports taking a multivitamin ([employer] brand)   [x]   []       Change in health/diet/appetite  - Patient reports eating more salads lately  - Patient reports having ensure shakes (2-3 x week)  []   [x]       Change in alcohol use  []   [x]       Change in activity  []   [x]       Hospital admission  []   [x]       Emergency department visit  []   [x]       Other complaints      Clinical Outcomes:  Yes     No  []   [x]       Major bleeding event  []   [x]       Thromboembolic event    INR (no units)   Date Value   09/25/2024 7.52 (HH)   04/12/2024 1.9   04/03/2024 1.4   02/16/2024 2.0     INR,(POC) (no units)   Date Value   02/19/2025 2.4   02/05/2025 1.6   01/24/2025 1.8   12/27/2024 2.1         INR 1.9 is BELOW the acceptable therapeutic range of 2-3.    Recommend to take 10 mg today then continue  7.5 mg every Wed; 5 mg all other days (6.7%)  Patient counseled on risks of subtherapeutic INR  Will continue to monitor and check INR in 1-2 weeks   Dosing reminder card given with phone number, appointment date and time.   Return to clinic: 3/12/2025    Bunny Moore PharmD 2/26/2025 9:21 AM    Referring physician: Dr. Hua Maldonado    CPA signed  Referral: 4/2/24      For Pharmacy Admin Tracking

## 2025-03-12 ENCOUNTER — ANTI-COAG VISIT (OUTPATIENT)
Dept: PHARMACY | Age: 65
End: 2025-03-12
Payer: MEDICARE

## 2025-03-12 DIAGNOSIS — I48.21 PERMANENT ATRIAL FIBRILLATION (HCC): Primary | ICD-10-CM

## 2025-03-12 LAB
INTERNATIONAL NORMALIZATION RATIO, POC: 1.5
PROTHROMBIN TIME, POC: 0

## 2025-03-12 PROCEDURE — 99211 OFF/OP EST MAY X REQ PHY/QHP: CPT

## 2025-03-12 PROCEDURE — 85610 PROTHROMBIN TIME: CPT

## 2025-03-12 NOTE — PROGRESS NOTES
CPA signed  Referral: 24        For Pharmacy Admin Tracking Only  Intervention Detail: Adherence Monitorin  Total # of Interventions Recommended: 1  Total # of Interventions Accepted: 1  Time Spent (min): 15

## 2025-03-19 ENCOUNTER — ANTI-COAG VISIT (OUTPATIENT)
Dept: PHARMACY | Age: 65
End: 2025-03-19
Payer: MEDICARE

## 2025-03-19 DIAGNOSIS — I48.21 PERMANENT ATRIAL FIBRILLATION (HCC): Primary | ICD-10-CM

## 2025-03-19 LAB
INTERNATIONAL NORMALIZATION RATIO, POC: 1.5
PROTHROMBIN TIME, POC: 0

## 2025-03-19 PROCEDURE — 85610 PROTHROMBIN TIME: CPT

## 2025-03-19 PROCEDURE — 99211 OFF/OP EST MAY X REQ PHY/QHP: CPT

## 2025-03-19 NOTE — PROGRESS NOTES
Mr. Cristobal Bales is a 64 y.o. y/o male with history of Afib who presents today for anticoagulation monitoring and adjustment.  Patient started on Coumadin in 2013.    Pertinent PMH: T2DM, HTN, and chronic back pain.    Patient Reported Findings:  Yes     No  [x]   []       Patient verifies current dosing regimen as listed  - Patient has warfarin 5 mg tablets  []   [x]       S/Sx bleeding/bruising/swelling/SOB/CP  []   [x]       Blood in urine or stool  []   [x]       Procedures scheduled in the future at this time  []   [x]       Missed Dose  []   [x]       Extra Dose  []   [x]       Change in medications  - Patient reports Mounjaro was recently increased to 5 mg weekly  - Patient reports taking a multivitamin ([employer] brand)   [x]   []       Change in health/diet/appetite  - Patient reports eating more salads lately  - Patient reports having ensure shakes (2-3 x week)  []   [x]       Change in alcohol use  []   [x]       Change in activity  []   [x]       Hospital admission  []   [x]       Emergency department visit  []   [x]       Other complaints      Clinical Outcomes:  Yes     No  []   [x]       Major bleeding event  []   [x]       Thromboembolic event    INR (no units)   Date Value   09/25/2024 7.52 (HH)   04/12/2024 1.9   04/03/2024 1.4   02/16/2024 2.0     INR,(POC) (no units)   Date Value   03/12/2025 1.5   02/26/2025 1.9   02/19/2025 2.4   02/05/2025 1.6         INR 1.5 is BELOW the acceptable therapeutic range of 2-3.    Recommend to increase to take 10 mg today and then increase 5 mg every Sun, Tue, Thu; 7.5 mg all other days (5.9%)  Patient counseled on risks of subtherapeutic INR  Will continue to monitor and check INR in 1-2 weeks   AVS printed and reviewed with patient   Return to clinic: 3/31/2025    Referring physician: Dr. Hua Maldonado    CPA signed  Referral: 4/2/24    Bunny Moore, PharmD 3/19/2025 10:06 AM        For Pharmacy Admin Tracking Only  Intervention Detail: Adherence

## 2025-03-31 ENCOUNTER — ANTI-COAG VISIT (OUTPATIENT)
Dept: PHARMACY | Age: 65
End: 2025-03-31
Payer: MEDICARE

## 2025-03-31 DIAGNOSIS — I48.21 PERMANENT ATRIAL FIBRILLATION (HCC): Primary | ICD-10-CM

## 2025-03-31 LAB
INTERNATIONAL NORMALIZATION RATIO, POC: 1.8
PROTHROMBIN TIME, POC: 0

## 2025-03-31 PROCEDURE — 85610 PROTHROMBIN TIME: CPT

## 2025-03-31 PROCEDURE — 99211 OFF/OP EST MAY X REQ PHY/QHP: CPT

## 2025-03-31 NOTE — PROGRESS NOTES
Mr. Cristobal Bales is a 64 y.o. y/o male with history of Afib who presents today for anticoagulation monitoring and adjustment.  Patient started on Coumadin in 2013.    Pertinent PMH: T2DM, HTN, and chronic back pain.    Patient Reported Findings:  Yes     No  [x]   []       Patient verifies current dosing regimen as listed  - Patient has warfarin 5 mg tablets  []   [x]       S/Sx bleeding/bruising/swelling/SOB/CP  []   [x]       Blood in urine or stool  []   [x]       Procedures scheduled in the future at this time  []   [x]       Missed Dose  []   [x]       Extra Dose  []   [x]       Change in medications  - Patient reports Mounjaro was recently increased to 5 mg weekly  - Patient reports taking a multivitamin ([employer] brand)   [x]   []       Change in health/diet/appetite  - Patient reports eating more vitamin k foods recently and will be continuing this  - Patient reports having ensure shakes (2-3 x week)  []   [x]       Change in alcohol use  []   [x]       Change in activity  []   [x]       Hospital admission  []   [x]       Emergency department visit  []   [x]       Other complaints      Clinical Outcomes:  Yes     No  []   [x]       Major bleeding event  []   [x]       Thromboembolic event    INR (no units)   Date Value   09/25/2024 7.52 (HH)   04/12/2024 1.9   04/03/2024 1.4   02/16/2024 2.0     INR,(POC) (no units)   Date Value   03/19/2025 1.5   03/12/2025 1.5   02/26/2025 1.9   02/19/2025 2.4         INR 1.8 is BELOW the acceptable therapeutic range of 2-3.    Recommend to increase to take 10 mg today and then increase 5 mg every Sun, Thu; 7.5 mg all other days (5.6%)   Patient counseled on risks of subtherapeutic INR  Will continue to monitor and check INR in 1-2 weeks   AVS printed and reviewed with patient   Return to clinic: 4/9/2025    Referring physician: Dr. Hua Maldonado    CPA signed  Referral: 4/2/24    Bunny Moore, PharmD 3/31/2025 9:57 AM          For Pharmacy Admin Tracking

## 2025-04-09 ENCOUNTER — ANTI-COAG VISIT (OUTPATIENT)
Dept: PHARMACY | Age: 65
End: 2025-04-09
Payer: MEDICARE

## 2025-04-09 LAB
INTERNATIONAL NORMALIZATION RATIO, POC: 1.8
PROTHROMBIN TIME, POC: 0

## 2025-04-09 PROCEDURE — 85610 PROTHROMBIN TIME: CPT

## 2025-04-09 PROCEDURE — 99211 OFF/OP EST MAY X REQ PHY/QHP: CPT

## 2025-04-09 NOTE — PROGRESS NOTES
Pharmacy Admin Tracking Only  Intervention Detail: Adherence Monitorin and Dose Adjustment: 1, reason: Therapy Optimization  Total # of Interventions Recommended: 2  Total # of Interventions Accepted: 2  Time Spent (min): 15

## 2025-04-16 ENCOUNTER — ANTI-COAG VISIT (OUTPATIENT)
Dept: PHARMACY | Age: 65
End: 2025-04-16
Payer: MEDICARE

## 2025-04-16 DIAGNOSIS — I48.21 PERMANENT ATRIAL FIBRILLATION (HCC): Primary | ICD-10-CM

## 2025-04-16 LAB
INTERNATIONAL NORMALIZATION RATIO, POC: 1.8
PROTHROMBIN TIME, POC: 0

## 2025-04-16 PROCEDURE — 85610 PROTHROMBIN TIME: CPT

## 2025-04-16 PROCEDURE — 99211 OFF/OP EST MAY X REQ PHY/QHP: CPT

## 2025-04-16 NOTE — PROGRESS NOTES
Mr. Cristobal Bales is a 64 y.o. y/o male with history of Afib who presents today for anticoagulation monitoring and adjustment.  Patient started on Coumadin in 2013.    Pertinent PMH: T2DM, HTN, and chronic back pain.    Patient Reported Findings:  Yes     No  [x]   []       Patient verifies current dosing regimen as listed  - Patient has warfarin 5 mg tablets  []   [x]       S/Sx bleeding/bruising/swelling/SOB/CP  []   [x]       Blood in urine or stool  []   [x]       Procedures scheduled in the future at this time  []   [x]       Missed Dose  []   [x]       Extra Dose  []   [x]       Change in medications  - Tirzepatide re-started 4/10/2025  - Patient reports taking a multivitamin ([employer] brand)   [x]   []       Change in health/diet/appetite  - Patient reports eating more vitamin k foods recently and will be continuing this  - Patient reports having ensure shakes (2-3 x week)  []   [x]       Change in alcohol use  []   [x]       Change in activity  []   [x]       Hospital admission  []   [x]       Emergency department visit  []   [x]       Other complaints      Clinical Outcomes:  Yes     No  []   [x]       Major bleeding event  []   [x]       Thromboembolic event    INR (no units)   Date Value   09/25/2024 7.52 (HH)   04/12/2024 1.9   04/03/2024 1.4   02/16/2024 2.0     INR,(POC) (no units)   Date Value   04/09/2025 1.8   03/31/2025 1.8   03/19/2025 1.5   03/12/2025 1.5         INR 1.8 is BELOW the acceptable therapeutic range of 2-3.    Recommend to increase to 10 mg every Wed, Sat; 7.5 mg all other days (9.5%)  Patient counseled on risks of subtherapeutic INR  Will continue to monitor and check INR in 1-2 weeks   AVS printed and reviewed with patient   Return to clinic: 4/30/2025    Referring physician: Dr. Hua Maldonado    CPA signed  Referral: 4/2/24 (referral faxed to provider)    Bunny Moore, Pilar 4/16/2025 10:04 AM            For Pharmacy Admin Tracking Only  Intervention Detail: Adherence

## 2025-04-30 ENCOUNTER — ANTI-COAG VISIT (OUTPATIENT)
Dept: PHARMACY | Age: 65
End: 2025-04-30
Payer: MEDICARE

## 2025-04-30 DIAGNOSIS — I48.21 PERMANENT ATRIAL FIBRILLATION (HCC): Primary | ICD-10-CM

## 2025-04-30 LAB
INTERNATIONAL NORMALIZATION RATIO, POC: 2.1
PROTHROMBIN TIME, POC: 0

## 2025-04-30 PROCEDURE — 85610 PROTHROMBIN TIME: CPT

## 2025-04-30 PROCEDURE — 99211 OFF/OP EST MAY X REQ PHY/QHP: CPT

## 2025-04-30 NOTE — PROGRESS NOTES
Mr. Cristobal Bales is a 64 y.o. y/o male with history of Afib who presents today for anticoagulation monitoring and adjustment.  Patient started on Coumadin in 2013.    Pertinent PMH: T2DM, HTN, and chronic back pain.    Patient Reported Findings:  Yes     No  [x]   []       Patient verifies current dosing regimen as listed  - Patient has warfarin 5 mg tablets  - Warfarin 10 mg every Wed, Sat; 7.5 mg all other days  []   [x]       S/Sx bleeding/bruising/swelling/SOB/CP  []   [x]       Blood in urine or stool  []   [x]       Procedures scheduled in the future at this time  []   [x]       Missed Dose  []   [x]       Extra Dose  []   [x]       Change in medications  - Tirzepatide re-started 4/10/2025  - Patient reports taking a multivitamin ([employer] brand)   []   [x]       Change in health/diet/appetite  - Patient reports eating more vitamin k foods recently and will be continuing this  - Patient reports having ensure shakes (2-3 x week)  []   [x]       Change in alcohol use  []   [x]       Change in activity  []   [x]       Hospital admission  []   [x]       Emergency department visit  []   [x]       Other complaints      Clinical Outcomes:  Yes     No  []   [x]       Major bleeding event  []   [x]       Thromboembolic event    INR (no units)   Date Value   09/25/2024 7.52 (HH)   04/12/2024 1.9   04/03/2024 1.4   02/16/2024 2.0     INR,(POC) (no units)   Date Value   04/16/2025 1.8   04/09/2025 1.8   03/31/2025 1.8   03/19/2025 1.5         INR 2.1 is WITHIN the acceptable therapeutic range of 2-3.    Recommend to continue 10 mg every Wed, Sat; 7.5 mg all other days  Will continue to monitor and check INR in 3 weeks   AVS printed and reviewed with patient   Return to clinic: 5/21/2025    Referring physician: Dr. Hua Maldonado    CPA signed  Referral: 4/2/24 (referral faxed to provider)    Bunny Moore, PharmD 4/30/2025 9:08 AM            For Pharmacy Admin Tracking Only  Intervention Detail:   Total # of

## 2025-05-23 ENCOUNTER — ANTI-COAG VISIT (OUTPATIENT)
Dept: PHARMACY | Age: 65
End: 2025-05-23
Payer: MEDICARE

## 2025-05-23 DIAGNOSIS — I48.21 PERMANENT ATRIAL FIBRILLATION (HCC): Primary | ICD-10-CM

## 2025-05-23 LAB
INTERNATIONAL NORMALIZATION RATIO, POC: 2.5
PROTHROMBIN TIME, POC: 0

## 2025-05-23 PROCEDURE — 85610 PROTHROMBIN TIME: CPT

## 2025-05-23 PROCEDURE — 99211 OFF/OP EST MAY X REQ PHY/QHP: CPT

## 2025-05-23 RX ORDER — WARFARIN SODIUM 5 MG/1
7.5-1 TABLET ORAL DAILY
COMMUNITY

## 2025-05-23 NOTE — PROGRESS NOTES
Mr. Cristobal Bales is a 64 y.o. y/o male with history of Afib who presents today for anticoagulation monitoring and adjustment.  Patient started on Coumadin in 2013.    Pertinent PMH: T2DM, HTN, and chronic back pain.    Patient Reported Findings:  Yes     No  [x]   []       Patient verifies current dosing regimen as listed  - Patient has warfarin 5 mg tablets  - Warfarin 10 mg every Wed, Sat; 7.5 mg all other days  []   [x]       S/Sx bleeding/bruising/swelling/SOB/CP  []   [x]       Blood in urine or stool  []   [x]       Procedures scheduled in the future at this time  []   [x]       Missed Dose  []   [x]       Extra Dose  []   [x]       Change in medications  - Tirzepatide re-started 4/10/2025  - Patient reports taking a multivitamin ([employer] brand)   []   [x]       Change in health/diet/appetite  - Patient reports eating more vitamin k foods recently and will be continuing this  - Patient reports having ensure shakes (2-3 x week)  []   [x]       Change in alcohol use  []   [x]       Change in activity  []   [x]       Hospital admission  []   [x]       Emergency department visit  []   [x]       Other complaints      Clinical Outcomes:  Yes     No  []   [x]       Major bleeding event  []   [x]       Thromboembolic event    INR (no units)   Date Value   09/25/2024 7.52 (HH)   04/12/2024 1.9   04/03/2024 1.4   02/16/2024 2.0     INR,(POC) (no units)   Date Value   04/30/2025 2.1   04/16/2025 1.8   04/09/2025 1.8   03/31/2025 1.8         INR 2.5 is WITHIN the acceptable therapeutic range of 2-3.    Recommend to continue 10 mg every Wed, Sat; 7.5 mg all other days  Will continue to monitor and check INR in 4 weeks   AVS printed and reviewed with patient   Return to clinic: 6/20/2025    Referring physician: Dr. Hua Maldonado    CPA signed      Bunny Moore, PharmD           For Pharmacy Admin Tracking Only  Intervention Detail:   Total # of Interventions Recommended: 0  Total # of Interventions Accepted:

## 2025-06-24 ENCOUNTER — HOSPITAL ENCOUNTER (OUTPATIENT)
Dept: CARDIOLOGY | Age: 65
Discharge: HOME | End: 2025-06-26
Attending: INTERNAL MEDICINE
Payer: MEDICARE

## 2025-06-24 VITALS
BODY MASS INDEX: 38.36 KG/M2 | HEIGHT: 76 IN | WEIGHT: 315 LBS | DIASTOLIC BLOOD PRESSURE: 84 MMHG | SYSTOLIC BLOOD PRESSURE: 118 MMHG

## 2025-06-24 DIAGNOSIS — I10 ESSENTIAL HYPERTENSION: ICD-10-CM

## 2025-06-24 DIAGNOSIS — I42.9 CARDIOMYOPATHY, UNSPECIFIED TYPE (HCC): ICD-10-CM

## 2025-06-24 DIAGNOSIS — I50.32 CHRONIC DIASTOLIC HEART FAILURE (HCC): ICD-10-CM

## 2025-06-24 LAB
ECHO AO ASC DIAM: 3.7 CM
ECHO AO ASCENDING AORTA INDEX: 1.38 CM/M2
ECHO AO ROOT DIAM: 3.6 CM
ECHO AO ROOT INDEX: 1.34 CM/M2
ECHO AV AREA PEAK VELOCITY: 2 CM2
ECHO AV AREA VTI: 1.9 CM2
ECHO AV AREA/BSA PEAK VELOCITY: 0.7 CM2/M2
ECHO AV AREA/BSA VTI: 0.7 CM2/M2
ECHO AV CUSP MM: 1.8 CM
ECHO AV MEAN GRADIENT: 10 MMHG
ECHO AV MEAN VELOCITY: 1.4 M/S
ECHO AV PEAK GRADIENT: 18 MMHG
ECHO AV PEAK VELOCITY: 2.1 M/S
ECHO AV VELOCITY RATIO: 0.48
ECHO AV VTI: 39.5 CM
ECHO BSA: 2.77 M2
ECHO EST RA PRESSURE: 3 MMHG
ECHO LA AREA 2C: 29.7 CM2
ECHO LA AREA 4C: 29.8 CM2
ECHO LA DIAMETER INDEX: 1.82 CM/M2
ECHO LA DIAMETER: 4.9 CM
ECHO LA MAJOR AXIS: 7.4 CM
ECHO LA MINOR AXIS: 7.2 CM
ECHO LA TO AORTIC ROOT RATIO: 1.36
ECHO LA VOL BP: 99 ML (ref 18–58)
ECHO LA VOL MOD A2C: 98 ML (ref 18–58)
ECHO LA VOL MOD A4C: 97 ML (ref 18–58)
ECHO LA VOL/BSA BIPLANE: 37 ML/M2 (ref 16–34)
ECHO LA VOLUME INDEX MOD A2C: 36 ML/M2 (ref 16–34)
ECHO LA VOLUME INDEX MOD A4C: 36 ML/M2 (ref 16–34)
ECHO LV E' SEPTAL VELOCITY: 10 CM/S
ECHO LV EDV 3D: 201 ML
ECHO LV EDV INDEX 3D: 75 ML/M2
ECHO LV EF PHYSICIAN: 55 %
ECHO LV EJECTION FRACTION 3D: 54 %
ECHO LV ESV 3D: 92 ML
ECHO LV ESV INDEX 3D: 34 ML/M2
ECHO LV FRACTIONAL SHORTENING: 34 % (ref 28–44)
ECHO LV INTERNAL DIMENSION DIASTOLE INDEX: 2.27 CM/M2
ECHO LV INTERNAL DIMENSION DIASTOLIC: 6.1 CM (ref 4.2–5.9)
ECHO LV INTERNAL DIMENSION SYSTOLIC INDEX: 1.49 CM/M2
ECHO LV INTERNAL DIMENSION SYSTOLIC: 4 CM
ECHO LV ISOVOLUMETRIC RELAXATION TIME (IVRT): 74 MS
ECHO LV IVSD: 1.3 CM (ref 0.6–1)
ECHO LV MASS 2D: 359.6 G (ref 88–224)
ECHO LV MASS 3D INDEX: 85.1 G/M2
ECHO LV MASS 3D: 229 G
ECHO LV MASS INDEX 2D: 133.7 G/M2 (ref 49–115)
ECHO LV POSTERIOR WALL DIASTOLIC: 1.3 CM (ref 0.6–1)
ECHO LV RELATIVE WALL THICKNESS RATIO: 0.43
ECHO LVOT AREA: 4.2 CM2
ECHO LVOT AV VTI INDEX: 0.45
ECHO LVOT DIAM: 2.3 CM
ECHO LVOT MEAN GRADIENT: 2 MMHG
ECHO LVOT PEAK GRADIENT: 4 MMHG
ECHO LVOT PEAK VELOCITY: 1 M/S
ECHO LVOT STROKE VOLUME INDEX: 27.2 ML/M2
ECHO LVOT SV: 73.1 ML
ECHO LVOT VTI: 17.6 CM
ECHO MV E VELOCITY: 1.23 M/S
ECHO MV E/E' SEPTAL: 12.3
ECHO RA AREA 4C: 32.7 CM2
ECHO RA END SYSTOLIC VOLUME APICAL 4 CHAMBER INDEX BSA: 49 ML/M2
ECHO RA VOLUME: 132 ML
ECHO RIGHT VENTRICULAR SYSTOLIC PRESSURE (RVSP): 32 MMHG
ECHO RV FREE WALL PEAK S': 12.6 CM/S
ECHO RV TAPSE: 2.6 CM (ref 1.7–?)
ECHO TV REGURGITANT MAX VELOCITY: 2.67 M/S
ECHO TV REGURGITANT PEAK GRADIENT: 29 MMHG

## 2025-06-24 PROCEDURE — 93306 TTE W/DOPPLER COMPLETE: CPT | Performed by: INTERNAL MEDICINE

## 2025-06-24 PROCEDURE — 93306 TTE W/DOPPLER COMPLETE: CPT

## 2025-06-25 ENCOUNTER — ANTI-COAG VISIT (OUTPATIENT)
Dept: PHARMACY | Age: 65
End: 2025-06-25
Payer: MEDICARE

## 2025-06-25 DIAGNOSIS — I48.21 PERMANENT ATRIAL FIBRILLATION (HCC): Primary | ICD-10-CM

## 2025-06-25 LAB
INTERNATIONAL NORMALIZATION RATIO, POC: 2.6
PROTHROMBIN TIME, POC: 0

## 2025-06-25 PROCEDURE — 85610 PROTHROMBIN TIME: CPT

## 2025-06-25 PROCEDURE — 99211 OFF/OP EST MAY X REQ PHY/QHP: CPT

## 2025-06-26 ENCOUNTER — RESULTS FOLLOW-UP (OUTPATIENT)
Dept: CARDIOLOGY CLINIC | Age: 65
End: 2025-06-26

## 2025-07-01 ENCOUNTER — OFFICE VISIT (OUTPATIENT)
Dept: CARDIOLOGY CLINIC | Age: 65
End: 2025-07-01
Payer: MEDICARE

## 2025-07-01 VITALS
BODY MASS INDEX: 33.11 KG/M2 | WEIGHT: 272 LBS | DIASTOLIC BLOOD PRESSURE: 62 MMHG | SYSTOLIC BLOOD PRESSURE: 96 MMHG | OXYGEN SATURATION: 98 % | HEART RATE: 76 BPM

## 2025-07-01 DIAGNOSIS — E78.2 MIXED HYPERLIPIDEMIA: ICD-10-CM

## 2025-07-01 DIAGNOSIS — I50.32 CHRONIC DIASTOLIC HEART FAILURE (HCC): Primary | ICD-10-CM

## 2025-07-01 DIAGNOSIS — G47.33 OBSTRUCTIVE SLEEP APNEA SYNDROME: ICD-10-CM

## 2025-07-01 DIAGNOSIS — I48.21 PERMANENT ATRIAL FIBRILLATION (HCC): ICD-10-CM

## 2025-07-01 DIAGNOSIS — I10 ESSENTIAL HYPERTENSION: ICD-10-CM

## 2025-07-01 PROCEDURE — 3078F DIAST BP <80 MM HG: CPT | Performed by: INTERNAL MEDICINE

## 2025-07-01 PROCEDURE — G2211 COMPLEX E/M VISIT ADD ON: HCPCS | Performed by: INTERNAL MEDICINE

## 2025-07-01 PROCEDURE — 1123F ACP DISCUSS/DSCN MKR DOCD: CPT | Performed by: INTERNAL MEDICINE

## 2025-07-01 PROCEDURE — 3074F SYST BP LT 130 MM HG: CPT | Performed by: INTERNAL MEDICINE

## 2025-07-01 PROCEDURE — 99215 OFFICE O/P EST HI 40 MIN: CPT | Performed by: INTERNAL MEDICINE

## 2025-07-01 RX ORDER — LIDOCAINE AND PRILOCAINE 25; 25 MG/G; MG/G
1 CREAM TOPICAL 3 TIMES DAILY
COMMUNITY
Start: 2025-03-28

## 2025-07-01 RX ORDER — CLOTRIMAZOLE AND BETAMETHASONE DIPROPIONATE 10; .64 MG/G; MG/G
CREAM TOPICAL
COMMUNITY
Start: 2025-05-12

## 2025-07-01 RX ORDER — CLONIDINE HYDROCHLORIDE 0.1 MG/1
0.1 TABLET ORAL DAILY
Status: SHIPPED | COMMUNITY
Start: 2025-07-01

## 2025-07-01 NOTE — PROGRESS NOTES
[facility]   Advanced Heart Failure/Pulmonary Hypertension  Cardiac Evaluation      Cristobal Bales  YOB: 1960    Date of Visit:  7/1/25    Chief Complaint   Patient presents with    Follow-up    Congestive Heart Failure         History of Present Illness:  Cristobal Bales is a 64 y.o. male who presents for follow up for CHF. He was admitted from 12/26/16-01/01/17 to [facility] with c/o sob and CHA on going since October this year. Pt was seen by PCP who treated him for pneumonia. 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 He is having to sit up most of the night as a result of persistent nocturnal coughing fits. He report snoring and problem having a sufficient sleep at night, PCP has been talking to him about getting a sleep study done. Pt is an active smoker, uses inhaler at home but unaware of any formal diagnosis of COPD. He is morbidly obese with chronic venous edema/stasis of BL lower ext which has not changed much from baseline. Pt was found to be hypoxic in the mid 80s on RA which is new. CTA chest was negative for PE but showed bilateral areas of opacities, WBC was normal. Pt has baseline hx of HTN, Afib and obesity. He says that his swelling in legs is better but has significant abdominal swelling. He is on coumadin which is managed by his PCP.   Echo done on 5/11/18 shows EF 55% (see report below).    On 06/16/20 his weight was up 20 lbs since 09/2019. He reports poor diet choices during the COVID 19 pandemic shut down.   His echo from 12/08/20 showed RV dilated and normal EF of 50%.    On 12/08/20 he reported he was unable to use the CPAP.    At OV 8/31/2021 he reported his weight was down 11 lbs with better diet choices.    OV, 4/26/2022, He reports his PCP added Farxiga to assist with his diabetes management.    OV, 2/21/2023, He is still losing weight. He says his

## 2025-07-01 NOTE — PATIENT INSTRUCTIONS
Plan:  Decrease clonidine 0.1 mg once daily given hypotension  Labs now: BNP  Follow up in 6 months

## 2025-07-22 NOTE — PROGRESS NOTES
Mr. Cristobal Bales is a 64 y.o. y/o male with history of Afib who presents today for anticoagulation monitoring and adjustment.  Patient started on Coumadin in 2013.    Pertinent PMH: T2DM, HTN, and chronic back pain.    Patient Reported Findings:  Yes     No  [x]   []       Patient verifies current dosing regimen as listed  - Patient has warfarin 5 mg tablets  - Warfarin 10 mg every Wed, Sat; 7.5 mg all other days  []   [x]       S/Sx bleeding/bruising/swelling/SOB/CP  []   [x]       Blood in urine or stool  []   [x]       Procedures scheduled in the future at this time  []   [x]       Missed Dose  []   [x]       Extra Dose  []   [x]       Change in medications  - Tirzepatide re-started 4/10/2025  - Patient reports taking a multivitamin ([employer] brand)   []   [x]       Change in health/diet/appetite  - Patient reports eating more vitamin k foods recently and will be continuing this  - Patient reports having ensure shakes (2-3 x week)  []   [x]       Change in alcohol use  []   [x]       Change in activity  []   [x]       Hospital admission  []   [x]       Emergency department visit  []   [x]       Other complaints      Clinical Outcomes:  Yes     No  []   [x]       Major bleeding event  []   [x]       Thromboembolic event    INR (no units)   Date Value   09/25/2024 7.52 (HH)   04/12/2024 1.9   04/03/2024 1.4   02/16/2024 2.0     INR,(POC) (no units)   Date Value   06/25/2025 2.6   05/23/2025 2.5   04/30/2025 2.1   04/16/2025 1.8         INR 2.0 is WITHIN the acceptable therapeutic range of 2-3.    Recommend to continue 10 mg every Wed, Sat; 7.5 mg all other days  Will continue to monitor and check INR in 4 weeks   AVS printed and reviewed with patient   Return to clinic: 7/23/2025    Referring physician: Dr. Hua Maldonado  Referral Date: 6/19/2025    CPA signed      Bunny Moore, PharmD         For Pharmacy Admin Tracking Only  Intervention Detail:   Total # of Interventions Recommended: 0  Total # of

## 2025-07-23 ENCOUNTER — ANTI-COAG VISIT (OUTPATIENT)
Dept: PHARMACY | Age: 65
End: 2025-07-23
Payer: MEDICARE

## 2025-07-23 DIAGNOSIS — I48.21 PERMANENT ATRIAL FIBRILLATION (HCC): Primary | ICD-10-CM

## 2025-07-23 LAB
INTERNATIONAL NORMALIZATION RATIO, POC: 2
PROTHROMBIN TIME, POC: 0

## 2025-07-23 PROCEDURE — 85610 PROTHROMBIN TIME: CPT

## 2025-07-23 PROCEDURE — 99211 OFF/OP EST MAY X REQ PHY/QHP: CPT

## 2025-08-20 ENCOUNTER — ANTI-COAG VISIT (OUTPATIENT)
Dept: PHARMACY | Age: 65
End: 2025-08-20
Payer: MEDICARE

## 2025-08-20 DIAGNOSIS — I48.21 PERMANENT ATRIAL FIBRILLATION (HCC): Primary | ICD-10-CM

## 2025-08-20 LAB
INTERNATIONAL NORMALIZATION RATIO, POC: 2.5
PROTHROMBIN TIME, POC: NORMAL

## 2025-08-20 PROCEDURE — 85610 PROTHROMBIN TIME: CPT

## 2025-08-20 PROCEDURE — 99211 OFF/OP EST MAY X REQ PHY/QHP: CPT
